# Patient Record
Sex: FEMALE | Race: WHITE | Employment: OTHER | URBAN - METROPOLITAN AREA
[De-identification: names, ages, dates, MRNs, and addresses within clinical notes are randomized per-mention and may not be internally consistent; named-entity substitution may affect disease eponyms.]

---

## 2017-12-17 PROBLEM — A41.9 SEPSIS (HCC): Status: ACTIVE | Noted: 2017-12-17

## 2018-01-29 ENCOUNTER — HOSPITAL ENCOUNTER (OUTPATIENT)
Dept: MAMMOGRAPHY | Age: 73
Discharge: HOME | End: 2018-01-29
Attending: FAMILY MEDICINE | Admitting: FAMILY MEDICINE

## 2018-01-29 DIAGNOSIS — Z12.31 ENCOUNTER FOR SCREENING MAMMOGRAM FOR BREAST CANCER: ICD-10-CM

## 2021-03-11 ENCOUNTER — HOSPITAL ENCOUNTER (EMERGENCY)
Age: 76
Discharge: HOME | End: 2021-03-11
Attending: EMERGENCY MEDICINE
Payer: MEDICARE

## 2021-03-11 ENCOUNTER — APPOINTMENT (OUTPATIENT)
Dept: CT IMAGING | Age: 76
End: 2021-03-11
Payer: MEDICARE

## 2021-03-11 ENCOUNTER — APPOINTMENT (OUTPATIENT)
Dept: GENERAL RADIOLOGY | Age: 76
End: 2021-03-11
Payer: MEDICARE

## 2021-03-11 VITALS
OXYGEN SATURATION: 97 % | TEMPERATURE: 99.1 F | SYSTOLIC BLOOD PRESSURE: 104 MMHG | RESPIRATION RATE: 20 BRPM | DIASTOLIC BLOOD PRESSURE: 62 MMHG | HEART RATE: 83 BPM | WEIGHT: 104 LBS | BODY MASS INDEX: 18.43 KG/M2 | HEIGHT: 63 IN

## 2021-03-11 DIAGNOSIS — G45.9 TIA (TRANSIENT ISCHEMIC ATTACK): ICD-10-CM

## 2021-03-11 DIAGNOSIS — R41.82 ALTERED MENTAL STATUS, UNSPECIFIED ALTERED MENTAL STATUS TYPE: ICD-10-CM

## 2021-03-11 DIAGNOSIS — Z53.29 LEFT AGAINST MEDICAL ADVICE: Primary | ICD-10-CM

## 2021-03-11 LAB
A/G RATIO: 1.6 (ref 1.1–2.2)
ALBUMIN SERPL-MCNC: 4.2 G/DL (ref 3.4–5)
ALP BLD-CCNC: 76 U/L (ref 40–129)
ALT SERPL-CCNC: 8 U/L (ref 10–40)
ANION GAP SERPL CALCULATED.3IONS-SCNC: 8 MMOL/L (ref 3–16)
AST SERPL-CCNC: 18 U/L (ref 15–37)
BASOPHILS ABSOLUTE: 0 K/UL (ref 0–0.2)
BASOPHILS RELATIVE PERCENT: 1 %
BILIRUB SERPL-MCNC: 0.5 MG/DL (ref 0–1)
BILIRUBIN URINE: NEGATIVE
BLOOD, URINE: ABNORMAL
BUN BLDV-MCNC: 14 MG/DL (ref 7–20)
CALCIUM SERPL-MCNC: 8.9 MG/DL (ref 8.3–10.6)
CHLORIDE BLD-SCNC: 102 MMOL/L (ref 99–110)
CLARITY: CLEAR
CO2: 24 MMOL/L (ref 21–32)
COLOR: YELLOW
CREAT SERPL-MCNC: 0.8 MG/DL (ref 0.6–1.2)
EOSINOPHILS ABSOLUTE: 0.1 K/UL (ref 0–0.6)
EOSINOPHILS RELATIVE PERCENT: 1.6 %
EPITHELIAL CELLS, UA: ABNORMAL /HPF (ref 0–5)
GFR AFRICAN AMERICAN: >60
GFR NON-AFRICAN AMERICAN: >60
GLOBULIN: 2.6 G/DL
GLUCOSE BLD-MCNC: 106 MG/DL (ref 70–99)
GLUCOSE URINE: NEGATIVE MG/DL
HCT VFR BLD CALC: 37.9 % (ref 36–48)
HEMOGLOBIN: 12.6 G/DL (ref 12–16)
INR BLD: 1.1 (ref 0.86–1.14)
KETONES, URINE: NEGATIVE MG/DL
LEUKOCYTE ESTERASE, URINE: NEGATIVE
LYMPHOCYTES ABSOLUTE: 0.8 K/UL (ref 1–5.1)
LYMPHOCYTES RELATIVE PERCENT: 16.8 %
MCH RBC QN AUTO: 29.7 PG (ref 26–34)
MCHC RBC AUTO-ENTMCNC: 33.2 G/DL (ref 31–36)
MCV RBC AUTO: 89.5 FL (ref 80–100)
MICROSCOPIC EXAMINATION: YES
MONOCYTES ABSOLUTE: 0.2 K/UL (ref 0–1.3)
MONOCYTES RELATIVE PERCENT: 4 %
NEUTROPHILS ABSOLUTE: 3.7 K/UL (ref 1.7–7.7)
NEUTROPHILS RELATIVE PERCENT: 76.6 %
NITRITE, URINE: NEGATIVE
PDW BLD-RTO: 13.6 % (ref 12.4–15.4)
PH UA: 7 (ref 5–8)
PLATELET # BLD: 208 K/UL (ref 135–450)
PMV BLD AUTO: 6.9 FL (ref 5–10.5)
POTASSIUM SERPL-SCNC: 4.2 MMOL/L (ref 3.5–5.1)
PROTEIN UA: NEGATIVE MG/DL
PROTHROMBIN TIME: 12.8 SEC (ref 10–13.2)
RBC # BLD: 4.23 M/UL (ref 4–5.2)
RBC UA: ABNORMAL /HPF (ref 0–4)
SARS-COV-2, NAAT: NOT DETECTED
SODIUM BLD-SCNC: 134 MMOL/L (ref 136–145)
SPECIFIC GRAVITY UA: 1.01 (ref 1–1.03)
TOTAL PROTEIN: 6.8 G/DL (ref 6.4–8.2)
TROPONIN: <0.01 NG/ML
URINE REFLEX TO CULTURE: ABNORMAL
URINE TYPE: ABNORMAL
UROBILINOGEN, URINE: 0.2 E.U./DL
WBC # BLD: 4.8 K/UL (ref 4–11)
WBC UA: ABNORMAL /HPF (ref 0–5)

## 2021-03-11 PROCEDURE — 2580000003 HC RX 258: Performed by: NURSE PRACTITIONER

## 2021-03-11 PROCEDURE — 87635 SARS-COV-2 COVID-19 AMP PRB: CPT

## 2021-03-11 PROCEDURE — 36415 COLL VENOUS BLD VENIPUNCTURE: CPT

## 2021-03-11 PROCEDURE — 70496 CT ANGIOGRAPHY HEAD: CPT

## 2021-03-11 PROCEDURE — 85610 PROTHROMBIN TIME: CPT

## 2021-03-11 PROCEDURE — 99285 EMERGENCY DEPT VISIT HI MDM: CPT

## 2021-03-11 PROCEDURE — 71045 X-RAY EXAM CHEST 1 VIEW: CPT

## 2021-03-11 PROCEDURE — 93005 ELECTROCARDIOGRAM TRACING: CPT | Performed by: EMERGENCY MEDICINE

## 2021-03-11 PROCEDURE — 6360000004 HC RX CONTRAST MEDICATION: Performed by: EMERGENCY MEDICINE

## 2021-03-11 PROCEDURE — 80053 COMPREHEN METABOLIC PANEL: CPT

## 2021-03-11 PROCEDURE — 84484 ASSAY OF TROPONIN QUANT: CPT

## 2021-03-11 PROCEDURE — 85025 COMPLETE CBC W/AUTO DIFF WBC: CPT

## 2021-03-11 PROCEDURE — 81001 URINALYSIS AUTO W/SCOPE: CPT

## 2021-03-11 PROCEDURE — 70450 CT HEAD/BRAIN W/O DYE: CPT

## 2021-03-11 PROCEDURE — 6370000000 HC RX 637 (ALT 250 FOR IP): Performed by: NURSE PRACTITIONER

## 2021-03-11 RX ORDER — ACETAMINOPHEN 325 MG/1
650 TABLET ORAL ONCE
Status: COMPLETED | OUTPATIENT
Start: 2021-03-11 | End: 2021-03-11

## 2021-03-11 RX ORDER — 0.9 % SODIUM CHLORIDE 0.9 %
1000 INTRAVENOUS SOLUTION INTRAVENOUS ONCE
Status: COMPLETED | OUTPATIENT
Start: 2021-03-11 | End: 2021-03-11

## 2021-03-11 RX ADMIN — SODIUM CHLORIDE 1000 ML: 9 INJECTION, SOLUTION INTRAVENOUS at 17:16

## 2021-03-11 RX ADMIN — IOPAMIDOL 75 ML: 755 INJECTION, SOLUTION INTRAVENOUS at 17:37

## 2021-03-11 RX ADMIN — ACETAMINOPHEN 650 MG: 325 TABLET ORAL at 17:16

## 2021-03-11 ASSESSMENT — PAIN SCALES - GENERAL
PAINLEVEL_OUTOF10: 4
PAINLEVEL_OUTOF10: 4

## 2021-03-11 NOTE — ED PROVIDER NOTES
[facility] Emergency Department    CHIEF COMPLAINT  Altered Mental Status (pt to ED via EMS from home with c/o AMS. pt is normally alert and oriented x 4. pt arrives alert and oriented to person place and time. pt reports she had her covid vaccine 2nd dose yesterday. family states pt did not drink any water today, only had a beer for lunch)      SHARED SERVICE VISIT  I have seen and evaluated this patient with my supervising physician, Dr. Emily Castellanos 80. HISTORY OF PRESENT ILLNESS  Junior Elmore is a 67 y.o. female who presents to the ED from home via EMS with reported AMS. The patient received her second vaccine dose on yesterday. She is awake, alert, and oriented to person time and place upon arrival in ED. Patient is generally weak but has a mild left-sided facial droop, possible slurring of her speech (patient has a Persian accent), has truncal instability, has left-sided tongue deviation and is leaning to her right on the stretcher. Spoke with patient's  who provides more history regarding why the patient was brought into the ED today and he states that the patient was last known [email or web address] hrs. today. They were both at home on separate levels at their home appointment called her on the telephone and asked 1500 hrs. she was responding to him in \"monosyllables\" which he attributed to her possibly being asleep. He continued working until 1600 hrs. and went to check on her and found her \"head slumped over to one side\" and she was minimally responsive. States he called his daughters who quickly arrived to their home and the EMS was called. No other complaints, modifying factors or associated symptoms. Nursing notes reviewed.    Past Medical History:   Diagnosis Date    Hyperlipidemia     Influenza A 12/13/2017    Kidney stones     Vision impairment     wears glasses     Past Surgical History:   Procedure Laterality Date    APPENDECTOMY      BREAST SURGERY      [facility] [place] [address]  left eye    11/2010    CYSTOSCOPY      EYE SURGERY      rt cataract    PITUITARY SURGERY      benign tumor     History reviewed. No pertinent family history. Social History     Socioeconomic History    Marital status:      Spouse name: Not on file    Number of children: Not on file    Years of education: Not on file    Highest education level: Not on file   Occupational History    Not on file   Social Needs    Financial resource strain: Not on file    Food insecurity     Worry: Not on file     Inability: Not on file    Transportation needs     Medical: Not on file     Non-medical: Not on file   Tobacco Use    Smoking status: Never Smoker    Smokeless tobacco: Never Used   Substance and Sexual Activity    Alcohol use: Yes     Comment: 2 drinks/day    Drug use: No    Sexual activity: Not on file   Lifestyle    Physical activity     Days per week: Not on file     Minutes per session: Not on file    Stress: Not on file   Relationships    Social connections     Talks on phone: Not on file     Gets together: Not on file     Attends Bahai service: Not on file     Active member of club or organization: Not on file     Attends meetings of clubs or organizations: Not on file     Relationship status: Not on file    Intimate partner violence     Fear of current or ex partner: Not on file     Emotionally abused: Not on file     Physically abused: Not on file     Forced sexual activity: Not on file   Other Topics Concern    Not on file   Social History Narrative    Not on file     No current facility-administered medications for this encounter. Current Outpatient Medications   Medication Sig Dispense Refill    guaiFENesin (MUCINEX) 600 MG extended release tablet Take 1 tablet by mouth 2 times daily 20 tablet 0    cycloSPORINE (RESTASIS) 0.05 % ophthalmic emulsion 1 drop.       Cholecalciferol (VITAMIN D3) 5000 units CAPS Take by mouth      ondansetron (ZOFRAN stable but hypertensive at 112/67 mmHg and with a temperature of 103. 1. A stroke/cardiac workup was initiated including COVID-19, urinalysis reflex to culture, microscopic urinalysis, pro time INR, CBC, CMP, troponin, EKG, CT head, CTA head and neck, and CXR. Interventions: Patient was placed on cardiac monitoring and after NIH stroke scale performed and patient score = 2 she was taken immediately for stroke imaging including CT head without and CTA head and neck with contrast.  Patient score of 2 was due to facial droop and slurred speech. Spoke with Dr. Patti Peralta from the stroke team and gave patient's demographic, HPI, medical history, and last known well. Confirmed that the patient is not a TPA candidate due to last known well of 1300 hrs.         Labs Ordered:  I have reviewed and interpreted all of the currently available lab results from this visit:  Results for orders placed or performed during the hospital encounter of 03/11/21   COVID-19, Rapid    Specimen: Nasopharyngeal Swab   Result Value Ref Range    SARS-CoV-2, NAAT Not Detected Not Detected   CBC auto differential   Result Value Ref Range    WBC 4.8 4.0 - 11.0 K/uL    RBC 4.23 4.00 - 5.20 M/uL    Hemoglobin 12.6 12.0 - 16.0 g/dL    Hematocrit 37.9 36.0 - 48.0 %    MCV 89.5 80.0 - 100.0 fL    MCH 29.7 26.0 - 34.0 pg    MCHC 33.2 31.0 - 36.0 g/dL    RDW 13.6 12.4 - 15.4 %    Platelets 640 134 - 579 K/uL    MPV 6.9 5.0 - 10.5 fL    Neutrophils % 76.6 %    Lymphocytes % 16.8 %    Monocytes % 4.0 %    Eosinophils % 1.6 %    Basophils % 1.0 %    Neutrophils Absolute 3.7 1.7 - 7.7 K/uL    Lymphocytes Absolute 0.8 (L) 1.0 - 5.1 K/uL    Monocytes Absolute 0.2 0.0 - 1.3 K/uL    Eosinophils Absolute 0.1 0.0 - 0.6 K/uL    Basophils Absolute 0.0 0.0 - 0.2 K/uL   Comprehensive metabolic panel   Result Value Ref Range    Sodium 134 (L) 136 - 145 mmol/L    Potassium 4.2 3.5 - 5.1 mmol/L    Chloride 102 99 - 110 mmol/L    CO2 24 21 - 32 mmol/L    Anion Gap 8 3 - 16    Glucose 106 (H) 70 - 99 mg/dL    BUN 14 7 - 20 mg/dL    CREATININE 0.8 0.6 - 1.2 mg/dL    GFR Non-African American >60 >60    GFR African American >60 >60    Calcium 8.9 8.3 - 10.6 mg/dL    Total Protein 6.8 6.4 - 8.2 g/dL    Albumin 4.2 3.4 - 5.0 g/dL    Albumin/Globulin Ratio 1.6 1.1 - 2.2    Total Bilirubin 0.5 0.0 - 1.0 mg/dL    Alkaline Phosphatase 76 40 - 129 U/L    ALT 8 (L) 10 - 40 U/L    AST 18 15 - 37 U/L    Globulin 2.6 g/dL   Urinalysis Reflex to Culture    Specimen: Urine, clean catch   Result Value Ref Range    Color, UA Yellow Straw/Yellow    Clarity, UA Clear Clear    Glucose, Ur Negative Negative mg/dL    Bilirubin Urine Negative Negative    Ketones, Urine Negative Negative mg/dL    Specific Gravity, UA 1.015 1.005 - 1.030    Blood, Urine MODERATE (A) Negative    pH, UA 7.0 5.0 - 8.0    Protein, UA Negative Negative mg/dL    Urobilinogen, Urine 0.2 <2.0 E.U./dL    Nitrite, Urine Negative Negative    Leukocyte Esterase, Urine Negative Negative    Microscopic Examination YES     Urine Type NotGiven     Urine Reflex to Culture Not Indicated    Troponin   Result Value Ref Range    Troponin <0.01 <0.01 ng/mL   Protime-INR   Result Value Ref Range    Protime 12.8 10.0 - 13.2 sec    INR 1.10 0.86 - 1.14   Microscopic Urinalysis   Result Value Ref Range    WBC, UA None seen 0 - 5 /HPF    RBC, UA 5-10 (A) 0 - 4 /HPF    Epithelial Cells, UA 0-1 0 - 5 /HPF   EKG 12 Lead   Result Value Ref Range    Ventricular Rate 89 BPM    Atrial Rate 89 BPM    P-R Interval 142 ms    QRS Duration 68 ms    Q-T Interval 368 ms    QTc Calculation (Bazett) 447 ms    P Axis 36 degrees    R Axis 30 degrees    T Axis 45 degrees    Diagnosis       Normal sinus rhythmNormal ECGConfirmed by Maggie Pierson MD, Jodi Fox ([id number]) on 3/12/2021 11:36:13 AM           Imaging ordered:  Ct Head Wo Contrast    Result Date: 3/11/2021  No acute intracranial abnormality. Findings were discussed with Ally Shepard at 5:57 pm on 3/11/2021. Xr Chest Portable    Result Date: 3/11/2021  No acute process. Cta Head Neck W Contrast    Result Date: 3/11/2021  No acute arterial abnormality or hemodynamically significant arterial stenosis in the head or neck. Reevaluation:  Patient resting comfortably with eyes open and with stable vital signs. She is now afebrile and looks much better. Patient states that she has baseline facial droop, speech is not slurred, has improved with bilateral upper and lower extremity strength, and is overall more well appearing. Discussed admission with the patient who states she wants to go home and refuses admission. States that this has happened in the past when she was febrile and is not concerned that this is a TIA or other neurological problem. Patient states that she will sign out AMA. A discussion was had with Mrs. Bonds regarding AMA,  altered mental status, TIA, intention to admit and refused/declined admission. Risk management discussed and shared decision making had with patient and/or surrogate. All questions were answered. Patient will follow up with her PCP for further evaluation/treatment. All questions answered. Patient will return to ED for new/worsening symptoms. Patient is agreeable with this plan. Patient was not sent home with prescriptions. CRITICAL CARE TIME  0 Minutes of critical care time spent not including separately billable procedures. MDM  Patient presents to the emergency department with altered mental status/TIA. Alternate diagnoses are less likely based on history and physical. Considered stroke, intracranial bleed, trauma, infection/sepsis, medication side effect, intoxication/withdrawal, metabolic/electrolyte abnormalities, among others but less likely based on history and physical.    Patient had her second Covid vaccination on yesterday and experienced AMS and TIA today. Imaging via CT head and CTA head and neck with IV contrast rule out a stroke. However, the recommendation was for admission for further neurological evaluation but patient declines admission as she feels that this is a febrile reaction to a temperature of 103.1 as if she has had the same sort of reaction in the past 2 fevers. Discussed possible adverse events associated with the decision to sign out AMA up to and including disability and/or death. Patient states that she is willing to accept the consequences of the decision to sign out AMA. My attending physician nor I feel that the patient is safe or appropriate for discharged home but she refuses admission and will sign out AMA. She is of appropriate mentation, awake, alert, and oriented to make her own decisions and is therefore allowed to sign out AMA. She is instructed to follow-up with her PCP in the next 1-2 days to schedule follow-up appointment for reevaluation and will return to the emergency department for new or worsening symptoms including, but not limited to, developing unilateral extremity weakness, inability to ambulate, confusion, slurred speech, facial droop, or other concerns. Patient verbalized understanding and states she will follow up with her PCP. Clinical Impression:  Left AGAINST MEDICAL ADVICE  Altered mental status-resolved  Transient ischemic attack      DISPOSITION  (AMA) AGAINST MEDICAL ADVICE      This chart was created using Dragon dictation. Every effort was made by myself to ensure accuracy, however due to limitations of this technology errors may be present.       MICHELLE Rodgers - CNP  03/13/21 2093

## 2021-03-11 NOTE — ED NOTES
Pt arrival 1658  ED Carlos RODRIGUEZ assess & ordered  stroke team [phone]  CT notified of ED CODE STROKE 1722  PAGED \"ED CODE STROKE\" overhead [phone]  Lab notified of ED CODE STROKE 1723   stroke Dr. Jocy Henderson @ [id number] Sophia Maya RN  03/11/21 5130

## 2021-03-11 NOTE — ED NOTES
Bed: 04  Expected date:   Expected time:   Means of arrival:   Comments:  [address], [facility]  03/11/21 7988

## 2021-03-12 LAB
EKG ATRIAL RATE: 89 BPM
EKG DIAGNOSIS: NORMAL
EKG P AXIS: 36 DEGREES
EKG P-R INTERVAL: 142 MS
EKG Q-T INTERVAL: 368 MS
EKG QRS DURATION: 68 MS
EKG QTC CALCULATION (BAZETT): 447 MS
EKG R AXIS: 30 DEGREES
EKG T AXIS: 45 DEGREES
EKG VENTRICULAR RATE: 89 BPM

## 2021-03-12 PROCEDURE — 93010 ELECTROCARDIOGRAM REPORT: CPT | Performed by: INTERNAL MEDICINE

## 2022-05-06 ENCOUNTER — APPOINTMENT (OUTPATIENT)
Dept: GENERAL RADIOLOGY | Age: 77
DRG: 536 | End: 2022-05-06
Payer: MEDICARE

## 2022-05-06 ENCOUNTER — APPOINTMENT (OUTPATIENT)
Dept: CT IMAGING | Age: 77
DRG: 536 | End: 2022-05-06
Payer: MEDICARE

## 2022-05-06 ENCOUNTER — HOSPITAL ENCOUNTER (INPATIENT)
Age: 77
LOS: 4 days | Discharge: NURSING, REHAB OR LONG-TERM CARE | DRG: 536 | End: 2022-05-10
Attending: EMERGENCY MEDICINE | Admitting: INTERNAL MEDICINE
Payer: MEDICARE

## 2022-05-06 DIAGNOSIS — S32.810A MULTIPLE CLOSED FRACTURES OF PELVIS WITH STABLE DISRUPTION OF PELVIC RING, INITIAL ENCOUNTER (HCC): Primary | ICD-10-CM

## 2022-05-06 PROBLEM — S32.592A PUBIC RAMUS FRACTURE, LEFT, CLOSED, INITIAL ENCOUNTER (HCC): Status: ACTIVE | Noted: 2022-05-06

## 2022-05-06 LAB
A/G RATIO: 1.2 (ref 1.1–2.2)
ALBUMIN SERPL-MCNC: 3.9 G/DL (ref 3.4–5)
ALP BLD-CCNC: 78 U/L (ref 40–129)
ALT SERPL-CCNC: 25 U/L (ref 10–40)
ANION GAP SERPL CALCULATED.3IONS-SCNC: 13 MMOL/L (ref 3–16)
AST SERPL-CCNC: 43 U/L (ref 15–37)
BASOPHILS ABSOLUTE: 0 K/UL (ref 0–0.2)
BASOPHILS RELATIVE PERCENT: 0.8 %
BILIRUB SERPL-MCNC: 0.9 MG/DL (ref 0–1)
BILIRUBIN URINE: NEGATIVE
BLOOD, URINE: ABNORMAL
BUN BLDV-MCNC: 10 MG/DL (ref 7–20)
CALCIUM SERPL-MCNC: 9.8 MG/DL (ref 8.3–10.6)
CHLORIDE BLD-SCNC: 95 MMOL/L (ref 99–110)
CLARITY: CLEAR
CO2: 22 MMOL/L (ref 21–32)
COLOR: ABNORMAL
CREAT SERPL-MCNC: 0.6 MG/DL (ref 0.6–1.2)
EOSINOPHILS ABSOLUTE: 0.2 K/UL (ref 0–0.6)
EOSINOPHILS RELATIVE PERCENT: 3.9 %
GFR AFRICAN AMERICAN: >60
GFR NON-AFRICAN AMERICAN: >60
GLUCOSE BLD-MCNC: 110 MG/DL (ref 70–99)
GLUCOSE URINE: NEGATIVE MG/DL
HCT VFR BLD CALC: 39.7 % (ref 36–48)
HEMOGLOBIN: 13 G/DL (ref 12–16)
KETONES, URINE: 15 MG/DL
LEUKOCYTE ESTERASE, URINE: NEGATIVE
LYMPHOCYTES ABSOLUTE: 0.8 K/UL (ref 1–5.1)
LYMPHOCYTES RELATIVE PERCENT: 17 %
MCH RBC QN AUTO: 29.2 PG (ref 26–34)
MCHC RBC AUTO-ENTMCNC: 32.6 G/DL (ref 31–36)
MCV RBC AUTO: 89.5 FL (ref 80–100)
MICROSCOPIC EXAMINATION: YES
MONOCYTES ABSOLUTE: 0.1 K/UL (ref 0–1.3)
MONOCYTES RELATIVE PERCENT: 2.2 %
NEUTROPHILS ABSOLUTE: 3.7 K/UL (ref 1.7–7.7)
NEUTROPHILS RELATIVE PERCENT: 76.1 %
NITRITE, URINE: NEGATIVE
PDW BLD-RTO: 14.5 % (ref 12.4–15.4)
PH UA: 7.5 (ref 5–8)
PLATELET # BLD: 171 K/UL (ref 135–450)
PMV BLD AUTO: 7 FL (ref 5–10.5)
POTASSIUM REFLEX MAGNESIUM: 4.1 MMOL/L (ref 3.5–5.1)
PROTEIN UA: NEGATIVE MG/DL
RBC # BLD: 4.44 M/UL (ref 4–5.2)
RBC UA: ABNORMAL /HPF (ref 0–4)
SODIUM BLD-SCNC: 130 MMOL/L (ref 136–145)
SPECIFIC GRAVITY UA: 1.01 (ref 1–1.03)
TOTAL CK: 404 U/L (ref 26–192)
TOTAL PROTEIN: 7.2 G/DL (ref 6.4–8.2)
TROPONIN: <0.01 NG/ML
URINE REFLEX TO CULTURE: ABNORMAL
URINE TYPE: ABNORMAL
UROBILINOGEN, URINE: 1 E.U./DL
WBC # BLD: 4.9 K/UL (ref 4–11)
WBC UA: ABNORMAL /HPF (ref 0–5)

## 2022-05-06 PROCEDURE — 81001 URINALYSIS AUTO W/SCOPE: CPT

## 2022-05-06 PROCEDURE — 99285 EMERGENCY DEPT VISIT HI MDM: CPT

## 2022-05-06 PROCEDURE — G0378 HOSPITAL OBSERVATION PER HR: HCPCS

## 2022-05-06 PROCEDURE — 80053 COMPREHEN METABOLIC PANEL: CPT

## 2022-05-06 PROCEDURE — 82550 ASSAY OF CK (CPK): CPT

## 2022-05-06 PROCEDURE — 83935 ASSAY OF URINE OSMOLALITY: CPT

## 2022-05-06 PROCEDURE — 73700 CT LOWER EXTREMITY W/O DYE: CPT

## 2022-05-06 PROCEDURE — 96374 THER/PROPH/DIAG INJ IV PUSH: CPT

## 2022-05-06 PROCEDURE — 96361 HYDRATE IV INFUSION ADD-ON: CPT

## 2022-05-06 PROCEDURE — 84300 ASSAY OF URINE SODIUM: CPT

## 2022-05-06 PROCEDURE — 73502 X-RAY EXAM HIP UNI 2-3 VIEWS: CPT

## 2022-05-06 PROCEDURE — 6360000002 HC RX W HCPCS: Performed by: EMERGENCY MEDICINE

## 2022-05-06 PROCEDURE — 85025 COMPLETE CBC W/AUTO DIFF WBC: CPT

## 2022-05-06 PROCEDURE — 93005 ELECTROCARDIOGRAM TRACING: CPT | Performed by: EMERGENCY MEDICINE

## 2022-05-06 PROCEDURE — 84484 ASSAY OF TROPONIN QUANT: CPT

## 2022-05-06 PROCEDURE — 2580000003 HC RX 258: Performed by: EMERGENCY MEDICINE

## 2022-05-06 PROCEDURE — 71045 X-RAY EXAM CHEST 1 VIEW: CPT

## 2022-05-06 PROCEDURE — 70450 CT HEAD/BRAIN W/O DYE: CPT

## 2022-05-06 PROCEDURE — 72125 CT NECK SPINE W/O DYE: CPT

## 2022-05-06 PROCEDURE — 1200000000 HC SEMI PRIVATE

## 2022-05-06 RX ORDER — ACETAMINOPHEN 325 MG/1
650 TABLET ORAL EVERY 6 HOURS PRN
Status: DISCONTINUED | OUTPATIENT
Start: 2022-05-06 | End: 2022-05-10 | Stop reason: HOSPADM

## 2022-05-06 RX ORDER — OXYCODONE HYDROCHLORIDE 5 MG/1
5 TABLET ORAL EVERY 4 HOURS PRN
Status: DISCONTINUED | OUTPATIENT
Start: 2022-05-06 | End: 2022-05-10 | Stop reason: HOSPADM

## 2022-05-06 RX ORDER — FENTANYL CITRATE 50 UG/ML
50 INJECTION, SOLUTION INTRAMUSCULAR; INTRAVENOUS ONCE
Status: COMPLETED | OUTPATIENT
Start: 2022-05-06 | End: 2022-05-06

## 2022-05-06 RX ORDER — SODIUM CHLORIDE 0.9 % (FLUSH) 0.9 %
10 SYRINGE (ML) INJECTION PRN
Status: DISCONTINUED | OUTPATIENT
Start: 2022-05-06 | End: 2022-05-10 | Stop reason: HOSPADM

## 2022-05-06 RX ORDER — POTASSIUM CHLORIDE 7.45 MG/ML
10 INJECTION INTRAVENOUS PRN
Status: DISCONTINUED | OUTPATIENT
Start: 2022-05-06 | End: 2022-05-10 | Stop reason: HOSPADM

## 2022-05-06 RX ORDER — SODIUM CHLORIDE 9 MG/ML
INJECTION, SOLUTION INTRAVENOUS PRN
Status: DISCONTINUED | OUTPATIENT
Start: 2022-05-06 | End: 2022-05-10 | Stop reason: HOSPADM

## 2022-05-06 RX ORDER — OXYCODONE HYDROCHLORIDE 5 MG/1
10 TABLET ORAL EVERY 4 HOURS PRN
Status: DISCONTINUED | OUTPATIENT
Start: 2022-05-06 | End: 2022-05-10 | Stop reason: HOSPADM

## 2022-05-06 RX ORDER — ONDANSETRON 2 MG/ML
4 INJECTION INTRAMUSCULAR; INTRAVENOUS ONCE
Status: COMPLETED | OUTPATIENT
Start: 2022-05-06 | End: 2022-05-06

## 2022-05-06 RX ORDER — ACETAMINOPHEN 650 MG/1
650 SUPPOSITORY RECTAL EVERY 6 HOURS PRN
Status: DISCONTINUED | OUTPATIENT
Start: 2022-05-06 | End: 2022-05-10 | Stop reason: HOSPADM

## 2022-05-06 RX ORDER — UBIDECARENONE 75 MG
50 CAPSULE ORAL DAILY
COMMUNITY

## 2022-05-06 RX ORDER — SODIUM CHLORIDE 0.9 % (FLUSH) 0.9 %
10 SYRINGE (ML) INJECTION EVERY 12 HOURS SCHEDULED
Status: DISCONTINUED | OUTPATIENT
Start: 2022-05-06 | End: 2022-05-10 | Stop reason: HOSPADM

## 2022-05-06 RX ORDER — PROMETHAZINE HYDROCHLORIDE 25 MG/1
12.5 TABLET ORAL EVERY 6 HOURS PRN
Status: DISCONTINUED | OUTPATIENT
Start: 2022-05-06 | End: 2022-05-10 | Stop reason: HOSPADM

## 2022-05-06 RX ORDER — ENOXAPARIN SODIUM 100 MG/ML
40 INJECTION SUBCUTANEOUS DAILY
Status: DISCONTINUED | OUTPATIENT
Start: 2022-05-07 | End: 2022-05-07

## 2022-05-06 RX ORDER — ONDANSETRON 2 MG/ML
4 INJECTION INTRAMUSCULAR; INTRAVENOUS EVERY 6 HOURS PRN
Status: DISCONTINUED | OUTPATIENT
Start: 2022-05-06 | End: 2022-05-10 | Stop reason: HOSPADM

## 2022-05-06 RX ORDER — SODIUM CHLORIDE 9 MG/ML
1000 INJECTION, SOLUTION INTRAVENOUS CONTINUOUS
Status: DISCONTINUED | OUTPATIENT
Start: 2022-05-06 | End: 2022-05-07

## 2022-05-06 RX ORDER — MAGNESIUM SULFATE IN WATER 40 MG/ML
2000 INJECTION, SOLUTION INTRAVENOUS PRN
Status: DISCONTINUED | OUTPATIENT
Start: 2022-05-06 | End: 2022-05-10 | Stop reason: HOSPADM

## 2022-05-06 RX ADMIN — SODIUM CHLORIDE 1000 ML: 9 INJECTION, SOLUTION INTRAVENOUS at 18:27

## 2022-05-06 RX ADMIN — ONDANSETRON 4 MG: 2 INJECTION INTRAMUSCULAR; INTRAVENOUS at 18:27

## 2022-05-06 RX ADMIN — FENTANYL CITRATE 50 MCG: 50 INJECTION, SOLUTION INTRAMUSCULAR; INTRAVENOUS at 18:27

## 2022-05-06 ASSESSMENT — PAIN DESCRIPTION - DESCRIPTORS: DESCRIPTORS: ACHING

## 2022-05-06 ASSESSMENT — PAIN SCALES - GENERAL
PAINLEVEL_OUTOF10: 0
PAINLEVEL_OUTOF10: 2
PAINLEVEL_OUTOF10: 5

## 2022-05-06 ASSESSMENT — PAIN DESCRIPTION - LOCATION
LOCATION: HIP
LOCATION: HIP

## 2022-05-06 ASSESSMENT — PAIN DESCRIPTION - ORIENTATION
ORIENTATION: LEFT
ORIENTATION: LEFT

## 2022-05-06 ASSESSMENT — PAIN - FUNCTIONAL ASSESSMENT: PAIN_FUNCTIONAL_ASSESSMENT: 0-10

## 2022-05-07 ENCOUNTER — APPOINTMENT (OUTPATIENT)
Dept: CT IMAGING | Age: 77
DRG: 536 | End: 2022-05-07
Payer: MEDICARE

## 2022-05-07 LAB
A/G RATIO: 1.3 (ref 1.1–2.2)
ALBUMIN SERPL-MCNC: 4 G/DL (ref 3.4–5)
ALP BLD-CCNC: 82 U/L (ref 40–129)
ALT SERPL-CCNC: 22 U/L (ref 10–40)
ANION GAP SERPL CALCULATED.3IONS-SCNC: 14 MMOL/L (ref 3–16)
AST SERPL-CCNC: 30 U/L (ref 15–37)
BASOPHILS ABSOLUTE: 0 K/UL (ref 0–0.2)
BASOPHILS RELATIVE PERCENT: 0.8 %
BILIRUB SERPL-MCNC: 0.9 MG/DL (ref 0–1)
BUN BLDV-MCNC: 6 MG/DL (ref 7–20)
CALCIUM SERPL-MCNC: 8.7 MG/DL (ref 8.3–10.6)
CHLORIDE BLD-SCNC: 98 MMOL/L (ref 99–110)
CO2: 23 MMOL/L (ref 21–32)
CREAT SERPL-MCNC: 0.6 MG/DL (ref 0.6–1.2)
EKG ATRIAL RATE: 103 BPM
EKG DIAGNOSIS: NORMAL
EKG P AXIS: 46 DEGREES
EKG P-R INTERVAL: 194 MS
EKG Q-T INTERVAL: 304 MS
EKG QRS DURATION: 70 MS
EKG QTC CALCULATION (BAZETT): 398 MS
EKG R AXIS: 30 DEGREES
EKG T AXIS: -31 DEGREES
EKG VENTRICULAR RATE: 103 BPM
EOSINOPHILS ABSOLUTE: 0.2 K/UL (ref 0–0.6)
EOSINOPHILS RELATIVE PERCENT: 4.1 %
GFR AFRICAN AMERICAN: >60
GFR NON-AFRICAN AMERICAN: >60
GLUCOSE BLD-MCNC: 112 MG/DL (ref 70–99)
HCT VFR BLD CALC: 37.2 % (ref 36–48)
HEMOGLOBIN: 12.6 G/DL (ref 12–16)
LYMPHOCYTES ABSOLUTE: 0.7 K/UL (ref 1–5.1)
LYMPHOCYTES RELATIVE PERCENT: 14.8 %
MAGNESIUM: 2 MG/DL (ref 1.8–2.4)
MCH RBC QN AUTO: 29.7 PG (ref 26–34)
MCHC RBC AUTO-ENTMCNC: 33.9 G/DL (ref 31–36)
MCV RBC AUTO: 87.5 FL (ref 80–100)
MONOCYTES ABSOLUTE: 0.1 K/UL (ref 0–1.3)
MONOCYTES RELATIVE PERCENT: 2.5 %
NEUTROPHILS ABSOLUTE: 3.7 K/UL (ref 1.7–7.7)
NEUTROPHILS RELATIVE PERCENT: 77.8 %
OSMOLALITY URINE: 675 MOSM/KG (ref 390–1070)
OSMOLALITY: 280 MOSM/KG (ref 280–301)
PDW BLD-RTO: 14.2 % (ref 12.4–15.4)
PLATELET # BLD: 168 K/UL (ref 135–450)
PMV BLD AUTO: 7.3 FL (ref 5–10.5)
POTASSIUM REFLEX MAGNESIUM: 4.1 MMOL/L (ref 3.5–5.1)
PRO-BNP: 630 PG/ML (ref 0–449)
RBC # BLD: 4.25 M/UL (ref 4–5.2)
SODIUM BLD-SCNC: 135 MMOL/L (ref 136–145)
SODIUM URINE: 155 MMOL/L
TOTAL PROTEIN: 7.1 G/DL (ref 6.4–8.2)
TROPONIN: <0.01 NG/ML
VITAMIN D 25-HYDROXY: 37.8 NG/ML
WBC # BLD: 4.8 K/UL (ref 4–11)

## 2022-05-07 PROCEDURE — 84484 ASSAY OF TROPONIN QUANT: CPT

## 2022-05-07 PROCEDURE — 99222 1ST HOSP IP/OBS MODERATE 55: CPT | Performed by: ORTHOPAEDIC SURGERY

## 2022-05-07 PROCEDURE — 85025 COMPLETE CBC W/AUTO DIFF WBC: CPT

## 2022-05-07 PROCEDURE — 2700000000 HC OXYGEN THERAPY PER DAY

## 2022-05-07 PROCEDURE — 94761 N-INVAS EAR/PLS OXIMETRY MLT: CPT

## 2022-05-07 PROCEDURE — 36415 COLL VENOUS BLD VENIPUNCTURE: CPT

## 2022-05-07 PROCEDURE — 6370000000 HC RX 637 (ALT 250 FOR IP): Performed by: INTERNAL MEDICINE

## 2022-05-07 PROCEDURE — 83735 ASSAY OF MAGNESIUM: CPT

## 2022-05-07 PROCEDURE — 83880 ASSAY OF NATRIURETIC PEPTIDE: CPT

## 2022-05-07 PROCEDURE — 1200000000 HC SEMI PRIVATE

## 2022-05-07 PROCEDURE — 83930 ASSAY OF BLOOD OSMOLALITY: CPT

## 2022-05-07 PROCEDURE — G0378 HOSPITAL OBSERVATION PER HR: HCPCS

## 2022-05-07 PROCEDURE — 6360000002 HC RX W HCPCS: Performed by: INTERNAL MEDICINE

## 2022-05-07 PROCEDURE — 80053 COMPREHEN METABOLIC PANEL: CPT

## 2022-05-07 PROCEDURE — 71250 CT THORAX DX C-: CPT

## 2022-05-07 PROCEDURE — 93010 ELECTROCARDIOGRAM REPORT: CPT | Performed by: INTERNAL MEDICINE

## 2022-05-07 PROCEDURE — 82306 VITAMIN D 25 HYDROXY: CPT

## 2022-05-07 PROCEDURE — 2580000003 HC RX 258: Performed by: INTERNAL MEDICINE

## 2022-05-07 PROCEDURE — 97530 THERAPEUTIC ACTIVITIES: CPT

## 2022-05-07 PROCEDURE — 2580000003 HC RX 258: Performed by: EMERGENCY MEDICINE

## 2022-05-07 PROCEDURE — 97166 OT EVAL MOD COMPLEX 45 MIN: CPT

## 2022-05-07 PROCEDURE — 97162 PT EVAL MOD COMPLEX 30 MIN: CPT

## 2022-05-07 RX ORDER — ENOXAPARIN SODIUM 100 MG/ML
30 INJECTION SUBCUTANEOUS DAILY
Status: DISCONTINUED | OUTPATIENT
Start: 2022-05-07 | End: 2022-05-10 | Stop reason: HOSPADM

## 2022-05-07 RX ORDER — POTASSIUM CHLORIDE 20 MEQ/1
40 TABLET, EXTENDED RELEASE ORAL ONCE
Status: DISCONTINUED | OUTPATIENT
Start: 2022-05-07 | End: 2022-05-10 | Stop reason: HOSPADM

## 2022-05-07 RX ORDER — ENOXAPARIN SODIUM 100 MG/ML
40 INJECTION SUBCUTANEOUS DAILY
Status: DISCONTINUED | OUTPATIENT
Start: 2022-05-07 | End: 2022-05-07 | Stop reason: DRUGHIGH

## 2022-05-07 RX ADMIN — OXYCODONE 5 MG: 5 TABLET ORAL at 10:26

## 2022-05-07 RX ADMIN — SODIUM CHLORIDE 1000 ML: 9 INJECTION, SOLUTION INTRAVENOUS at 04:08

## 2022-05-07 RX ADMIN — OXYCODONE 10 MG: 5 TABLET ORAL at 16:05

## 2022-05-07 RX ADMIN — ENOXAPARIN SODIUM 30 MG: 100 INJECTION SUBCUTANEOUS at 16:05

## 2022-05-07 RX ADMIN — SODIUM CHLORIDE, PRESERVATIVE FREE 10 ML: 5 INJECTION INTRAVENOUS at 10:26

## 2022-05-07 ASSESSMENT — PAIN - FUNCTIONAL ASSESSMENT
PAIN_FUNCTIONAL_ASSESSMENT: PREVENTS OR INTERFERES WITH MANY ACTIVE NOT PASSIVE ACTIVITIES
PAIN_FUNCTIONAL_ASSESSMENT: PREVENTS OR INTERFERES SOME ACTIVE ACTIVITIES AND ADLS

## 2022-05-07 ASSESSMENT — PAIN DESCRIPTION - LOCATION: LOCATION: PELVIS;HIP

## 2022-05-07 ASSESSMENT — PAIN SCALES - GENERAL
PAINLEVEL_OUTOF10: 3
PAINLEVEL_OUTOF10: 10
PAINLEVEL_OUTOF10: 10

## 2022-05-07 ASSESSMENT — PAIN DESCRIPTION - ORIENTATION: ORIENTATION: LEFT

## 2022-05-07 NOTE — CONSULTS
Date of Admission: 5/6/2022     Date of Service: Pt seen/examined on 5/7/2022     Chief complaint: left hip/pelvis pain     History Of Present Illness:       67 y.o. female who presented to [facility]  ED after a fall.     Patient reports she was walking outside her house and then turned and fell landing on her left side. Patient reported that after the fall she was not able to ambulate due to pain. She reports no antecedent pain but has had multiple recent falls. The patient lives with her .        Past Medical History:       Past Medical History             Diagnosis Date    Hyperlipidemia      Influenza A 12/13/2017    Kidney stones      Vision impairment       wears glasses            Past Surgical History:       Past Surgical History[]Expand by Default             Procedure Laterality Date    APPENDECTOMY        BREAST SURGERY         mass 1970    CATARACT REMOVAL   left eye    11/2010    CYSTOSCOPY        EYE SURGERY         rt cataract    PITUITARY SURGERY         benign tumor            Medications Prior to Admission:      Home Medications[]Expand by Default           Prior to Admission medications    Medication Sig Start Date End Date Taking? Authorizing Provider   vitamin B-12 (CYANOCOBALAMIN) 100 MCG tablet Take 50 mcg by mouth daily     Yes Historical Provider, MD   cycloSPORINE (RESTASIS) 0.05 % ophthalmic emulsion 1 drop.       Historical Provider, MD   Cholecalciferol (VITAMIN D3) 5000 units CAPS Take by mouth       Historical Provider, MD            Allergies:  Patient has no known allergies.     Social History:           TOBACCO:   reports that she has never smoked.  She has never used smokeless tobacco.    Family History:       Reviewed in detail, and noncontributory        REVIEW OF SYSTEMS: otherwise negative        PHYSICAL EXAM PERFORMED:     /80   Pulse 100   Temp 97.5 °F (36.4 °C) (Oral)   Resp 17   LMP  (LMP Unknown)   SpO2 94%      General appearance: no distress, appears her stated age, alert and oriented  MSK: Bilateral lower extremities are without abnormal appearance or deformity. She is able to dorsiflex and plantarflex both feet at the ankles as well as flex and extend her toes without weakness. Light touch sensation is intact bilaterally in all nerve distributions. Her feet are warm and well perfused. Labs:          Recent Labs     05/06/22  1800   WBC 4.9   HGB 13.0   HCT 39.7             Recent Labs     05/06/22  1800   *   K 4.1   CL 95*   CO2 22   BUN 10   CREATININE 0.6   CALCIUM 9.8           Radiology:      I have reviewed her pelvis and hip x-rays as well as her hip CT scan.          CT HIP LEFT WO CONTRAST   Final Result   1. Acute fracture of the left sacral ala. 2. Acute and mildly displaced fracture of the left superior pubic ramus   extending into the pubic symphysis. 3. Acute and mildly displaced fracture of the left inferior pubic ramus. 4. Osteopenia.            ASSESSMENT AND PLAN:    66 y/o woman with a stable LCI pelvis fracture. She is hemodynamically stable and neurovascularly intact. No surgical intervention is indicated. Recommendations:  -Weight bearing as tolerated using a walker as needed. PT/OT  -Osteopenia/osteoporosis evaluation and treatment  -DVT prophylaxis  -Pain control    Please call with questions.

## 2022-05-07 NOTE — ED NOTES
Report called to Emerson Hickey RN, C5. Pt transported to room 526 with tele box placed.       Gregg Mitchell RN  05/06/22 6698

## 2022-05-07 NOTE — CONSULTS
Comprehensive Nutrition Assessment    Type and Reason for Visit:  Initial,Consult    Nutrition Recommendations/Plan:   1. Advance diet as medically feasible to general for increased menu options   2. Monitor nutrition adequacy, pertinent labs, bowel habits, wt changes, and clinical progress     Malnutrition Assessment:  Malnutrition Status: At risk for malnutrition (Comment) (pt appears frail, ?malnutrition vs. pt always has been small?) (05/07/22 1236)    Context:  Acute Illness       Nutrition Assessment:    Consulted for unintentional weight loss. Pt nutritionally compromised AEB decreased appetite. Pt currently NPO, asking for food. Pt states eats 3 meals daily at home, but small portions. During visit pt reports lactose intolerance, RD added to allergy list. No weight hx. Pt reports UBW of 104 lb, but at one time she was 130 lb. ALEX when weight loss occured. Declined lactose free ONS. Will continue to monitor. Nutrition Related Findings:    Appears frail, ?malnutrition or always been small. Na 135. Wound Type: None       Current Nutrition Intake & Therapies:    Average Meal Intake: NPO  Average Supplements Intake: NPO  ADULT DIET; Regular    Anthropometric Measures:  Height: 5' 3\" (160 cm)  Ideal Body Weight (IBW): 115 lbs (52 kg)       Current Body Weight: 102 lb (46.3 kg), 88.7 % IBW.  Weight Source: Bed Scale  Current BMI (kg/m2): 18.1        Weight Adjustment For: No Adjustment                 BMI Categories: Underweight (BMI less than 18.5)    Estimated Daily Nutrient Needs:  Energy Requirements Based On: Kcal/kg (25-30)  Weight Used for Energy Requirements: Ideal  Energy (kcal/day): 9905-6952 kcal  Weight Used for Protein Requirements: Ideal (1.0-1.2 g/kg)  Protein (g/day): 53-64 g  Method Used for Fluid Requirements: 1 ml/kcal  Fluid (ml/day): 1608-9691 mL    Nutrition Diagnosis:   · Inadequate oral intake related to inadequate protein-energy intake as evidenced by NPO or clear liquid status due to medical condition      Nutrition Interventions:   Food and/or Nutrient Delivery: Start Oral Diet  Nutrition Education/Counseling: No recommendation at this time  Coordination of Nutrition Care: Continue to monitor while inpatient  Plan of Care discussed with: Patient    Goals:     Goals: PO intake 50% or greater,prior to discharge       Nutrition Monitoring and Evaluation:   Behavioral-Environmental Outcomes: None Identified  Food/Nutrient Intake Outcomes: Food and Nutrient Intake,Diet Advancement/Tolerance  Physical Signs/Symptoms Outcomes: Biochemical Data,Nutrition Focused Physical Findings,Weight    Discharge Planning:     Too soon to determine     Rafita Perez 87, RD, LD  Contact: [phone]

## 2022-05-07 NOTE — H&P
Hospital Medicine History & Physical      PCP: Sherrie Griffin MD    Date of Admission: 5/6/2022    Date of Service: Pt seen/examined on 5/6/2022    Pt seen/examined face to face on and admitted as observation with expected LOS less than two midnights but that can change depending on respose to medical therapy and clinical progress. Chief Complaint:    Chief Complaint   Patient presents with    Fall     fell in driveway last night. . worse throughout day. . pt complains of left hip pain         History Of Present Illness:      67 y.o. female who presented to [facility] with past medical history of hyperlipidemia on presented to the ED after a fall. Patient reports she was walking outside her house and then turned and fell landing on her left side. Patient reported that after the fall she was not able to ambulate due to pain. Pain would go up to 6/10 no known alleviating or exacerbating factor no associate with fever chills loss of conscious hitting her head focal deficits visual changes palpitations chest pain shortness of breath. Patient lives with her , and was told to come to the ED. Past Medical History:          Diagnosis Date    Hyperlipidemia     Influenza A 12/13/2017    Kidney stones     Vision impairment     wears glasses       Past Surgical History:          Procedure Laterality Date    APPENDECTOMY      BREAST SURGERY      mass 1970    CATARACT REMOVAL  left eye    11/2010    CYSTOSCOPY      EYE SURGERY      rt cataract    PITUITARY SURGERY      benign tumor       Medications Prior to Admission:      Prior to Admission medications    Medication Sig Start Date End Date Taking? Authorizing Provider   vitamin B-12 (CYANOCOBALAMIN) 100 MCG tablet Take 50 mcg by mouth daily   Yes Historical Provider, MD   cycloSPORINE (RESTASIS) 0.05 % ophthalmic emulsion 1 drop.     Historical Provider, MD   Cholecalciferol (VITAMIN D3) 5000 units CAPS Take by mouth    Historical Provider, MD       Allergies:  Patient has no known allergies. Social History:          TOBACCO:   reports that she has never smoked. She has never used smokeless tobacco.  ETOH:   reports current alcohol use. E-Cigarettes/Vaping Use     Questions Responses    E-Cigarette/Vaping Use Never User    Start Date     Passive Exposure     Quit Date     Counseling Given     Comments             Family History:      Reviewed in detail, and noncontributory      REVIEW OF SYSTEMS:     Constitutional:  No Fever, No Chills, No Night Sweats  ENT/Mouth:  No Nasal Congestion,  No Hoarseness, No new mouth lesion  Eyes:  No Eye Pain, No Redness, No Discharge  Cardiovascular:  No Chest Pain, No Orthopnea, No Palpitations  Respiratory:  No Cough, No Sputum, No Dyspnea  Gastrointestinal: No Vomiting, No Diarrhea, No abdominal pain  Genitourinary: No Urinary Frequency, No Hematuria, No Urinary pain  Musculoskeletal: + Worsening Arthralgias, + worsening Myalgias  Skin:  No new Skin Lesions, No new skin rash  Neuro:  No new weakness, No new numbness. Psych:  No suicial ideation, No Violence ideation    PHYSICAL EXAM PERFORMED:    /80   Pulse 100   Temp 97.5 °F (36.4 °C) (Oral)   Resp 17   LMP  (LMP Unknown)   SpO2 94%     General appearance:  mild acute distress, appears older than stated age  HEENT:   atraumatic, sclera anicteric, Conjunctivae clear. Frail, malnourished  Neck: Supple,Trachea midline, no goiter  Respiratory:minimal accessory muscle usage, Normal respiratory effort. Clear to auscultation, bilaterally without wheezing  Cardiovascular:  Regular rate and rhythm, capillary refill 2 seconds  Abdomen: Soft, non-tender, non-distended with normal bowel sounds. Musculoskeletal:  No clubbing, cyanosis. Pelvic tenderness mostly left-sided. Skin: turgor normal.  No new rashes or lesions. Neurologic: Alert and oriented x3, no new focal sensory/motor deficits.      Labs:     Recent Labs     05/06/22  1800   WBC 4.9 HGB 13.0   HCT 39.7        Recent Labs     05/06/22  1800   *   K 4.1   CL 95*   CO2 22   BUN 10   CREATININE 0.6   CALCIUM 9.8     Recent Labs     05/06/22  1800   AST 43*   ALT 25   BILITOT 0.9   ALKPHOS 78     No results for input(s): INR in the last 72 hours. Recent Labs     05/06/22  1800   CKTOTAL 404*   TROPONINI <0.01       Urinalysis:      Lab Results   Component Value Date    NITRU Negative 05/06/2022    WBCUA None seen 05/06/2022    BACTERIA 1+ 12/17/2017    RBCUA 11-20 05/06/2022    BLOODU MODERATE 05/06/2022    SPECGRAV 1.015 05/06/2022    GLUCOSEU Negative 05/06/2022       Radiology:     CXR: I have reviewed the CXR with the following interpretation:   Left lower lobe airspace disease  EKG:  I have reviewed the EKG with the following interpretation:   Sinus tachycardia   CT Cervical Spine WO Contrast   Final Result   1. No evidence of an acute fracture or traumatic malalignment involving the   cervical spine   2. Moderate size left pleural effusion partially visualized. Dedicated CT   imaging of the chest would be helpful for further evaluation. CT HIP LEFT WO CONTRAST   Final Result   1. Acute fracture of the left sacral ala. 2. Acute and mildly displaced fracture of the left superior pubic ramus   extending into the pubic symphysis. 3. Acute and mildly displaced fracture of the left inferior pubic ramus. 4. Osteopenia. CT Head WO Contrast   Final Result   No acute hemorrhage given artifact in the posterior fossa. No midline shift. Other findings as described. XR HIP 2-3 VW W PELVIS LEFT   Final Result   No acute abnormality of the hip. RECOMMENDATION:   Given the degree of osteopenia, nondisplaced fractures may be   radiographically occult. If pain or concern for fracture persists, consider   MR imaging. XR CHEST PORTABLE   Final Result   Hazy left lower lobe airspace disease and small left pleural effusion. ASSESSMENT AND PLAN:    Active Hospital Problems    Diagnosis Date Noted    Pubic ramus fracture, left, closed, initial encounter [facility]) [place] 05/06/2022     Priority: Medium     Fall:  Etiology mechanical  EKG sinus tachycardia   Continue telemetry  PT OT    Moderate size left pleural effusion:  Etiology unclear  CT chest, echo to rule out cardiac  Defer to primary for thoracentesis for further diagnostic work-up    Acute mildly displaced left superior pubic ramus extending into the pubic symphysis and acute mildly displaced fracture of the left inferior pubic rami,  Orthopedic consulted, much appreciated  Hyponatremia:  Urine OSM, Urine lites    Hyperlipidemia: Continue home medication    Malnutrition: Nutrition consulted    Hematuria: Outpatient follow-up    Diet: NPO except meds ordered    DVT Prophylaxis: Held    Dispo:   Expected LOS greater than two [address] [facility], DO

## 2022-05-07 NOTE — PROGRESS NOTES
Physical Therapy  Facility/Department: [place] [address] - MED SURG/ORTHO  Physical Therapy Initial Assessment and Treatment    Name: José Luis Wallace  : 1945  MRN: [id number]  Date of Service: 2022    Discharge Recommendations:  Subacute/Skilled Nursing Facility   PT Equipment Recommendations  Equipment Needed: No  Other: defer to facility      Patient Diagnosis(es): The encounter diagnosis was Multiple closed fractures of pelvis with stable disruption of pelvic ring, initial encounter ([facility] [address] Past Medical History:  has a past medical history of Hyperlipidemia, Influenza A, Kidney stones, and Vision impairment. Past Surgical History:  has a past surgical history that includes eye surgery; pituitary surgery; Cystoscopy; Breast surgery; Appendectomy; and Cataract removal (left eye    2010). If pt is unable to be seen after this session, please let this note serve as discharge summary. Please see case management note for discharge disposition. Thank you. Barriers to home discharge:   [x] Steps to access home entry or bed/bath: 16 NABIL   [x] Reported available assist at home upon discharge limited   [x] Patient or family requests DC to other than home    [x] Other: left pubic ramus fracture    Assessment   Body Structures, Functions, Activity Limitations Requiring Skilled Therapeutic Intervention: Decreased functional mobility ; Decreased cognition;Decreased endurance;Decreased ADL status; Decreased strength;Decreased safe awareness;Decreased balance;Decreased high-level IADLs;Decreased posture; Increased pain  Assessment: Patient is a 67year old female who presented to [facility] on 22 with left pubic ramus fracture. Orthopedics was consulted and they recommended non-operative management and WBAT LLE. Patient is normally independent with functional mobility. Today the patient required maximum assistance x 2 for bed mobility. Patient compelted her exercises with guidance.  Patient is below her prior level of function and would benefit from skilled PT plan of care. PT recommends that this patient receive skilled PT in the SNF setting, when medically stable, in order to address her therapy deficits and maximize her safety and independence with functional mobility. PT to continue to follow. Treatment Diagnosis: decreased independence with functional mobility. Specific Instructions for Next Treatment: progress mobility as tolerated  Therapy Prognosis: Good  Decision Making: Medium Complexity  Barriers to Learning: impaired cognition  Requires PT Follow-Up: Yes  Activity Tolerance  Activity Tolerance: Patient limited by fatigue;Patient limited by pain;Treatment limited secondary to decreased cognition;Patient limited by endurance  Activity Tolerance Comments: Vitals: 148/87  95 BPM 98% on room air. Patient educated on the benefits of increased mobility, weight bearing status WBAT LLE, use of call bell, benefits of exercises. Plan   Plan  Plan: 3-5 times per week  Plan weeks: 1 week 5/14/22  Specific Instructions for Next Treatment: progress mobility as tolerated  Current Treatment Recommendations: Strengthening,Balance training,Functional mobility training,Transfer training,Endurance training,Gait training,Safety education & training,Patient/Caregiver education & training,Equipment evaluation, education, & procurement,Therapeutic activities  Safety Devices  Type of Devices: Bed alarm in place,All bing prominences offloaded,Heels elevated for pressure relief,Left in bed,Nurse notified     Restrictions  Restrictions/Precautions  Restrictions/Precautions: Fall Risk,General Precautions  Lower Extremity Weight Bearing Restrictions  Left Lower Extremity Weight Bearing: Weight Bearing As Tolerated  Position Activity Restriction  Other position/activity restrictions:  Up with assist. WBAT     Subjective   Pain: 2/10 in R hip at rest with pt reporting increased pain during mobility  General  Chart Reviewed: Yes  Patient assessed for rehabilitation services?: Yes  Additional Pertinent Hx: HPI per chart, \"70 y.o. female who presented to [facility] with past medical history of hyperlipidemia on presented to the ED after a fall. Patient reports she was walking outside her house and then turned and fell landing on her left side. Patient reported that after the fall she was not able to ambulate due to pain. Pain would go up to 6/10 no known alleviating or exacerbating factor no associate with fever chills loss of conscious hitting her head focal deficits visual changes palpitations chest pain shortness of breath. Patient lives with her , and was told to come to the ED. Per ED note Dr. Tanika Patton discussed with Dr Guerrero of orthopedics and he states injuries are nonoperative. Patient admitted to the hospitalist service with plan for PT/OT eval in the morning and possible rehab placement. CT C-spine1. No evidence of an acute fracture or traumatic malalignment involving thecervical spine. \"  Response To Previous Treatment: Not applicable  Family / Caregiver Present: No  Referring Practitioner: Jeff Castorena DO  Referral Date : 05/07/22  General Comment  Comments: Supine in bed upon entry of therapy staff. Cleared for therapy by RN. Subjective  Subjective: Patient agreed to participate.          Social/Functional History  Social/Functional History  Lives With: Spouse ( available 24/7 however pt reporting he would not be able to physically assist)  Type of Home: House  Home Layout: Two level,1/2 bath on main level  Home Access: Stairs to enter with rails  Entrance Stairs - Number of Steps: 17 NABIL  Entrance Stairs - Rails: Both  Bathroom Shower/Tub: Tub/Shower unit  Bathroom Toilet: Standard  Home Equipment: Cane,Walker, rolling (does not use AD)  Has the patient had two or more falls in the past year or any fall with injury in the past year?: Yes (\"at least 1 fall every one and a half months)  Receives Help From: Family (daughter assists with IADLs PRN)  ADL Assistance: Independent  Homemaking Assistance: Independent  Homemaking Responsibilities: No  Ambulation Assistance: Independent  Transfer Assistance: Independent  Active : Yes  Mode of Transportation: Car  Occupation: Retired  Leisure & Hobbies: Travel  Vision/Hearing  Vision Exceptions: Wears glasses for reading  Hearing: Within functional limits    Cognition   Orientation  Overall Orientation Status: Within Normal Limits  Orientation Level: Oriented X4  Cognition  Overall Cognitive Status: Exceptions  Arousal/Alertness: Appropriate responses to stimuli  Following Commands: Follows multistep commands with repitition; Follows multistep commands with increased time  Attention Span: Attends with cues to redirect  Safety Judgement: Decreased awareness of need for safety  Problem Solving: Decreased awareness of errors  Insights: Decreased awareness of deficits  Initiation: Requires cues for some  Sequencing: Requires cues for some  Cognition Comment: frequent cueing to stay focused on task and for safety. patient also was anxious during therapy evaluation. Objective   Pulse: 92  Heart Rate Source: Monitor  BP: (!) 148/87  BP Location: Right upper arm  BP Method: Automatic  Patient Position: Semi fowlers  MAP (Calculated): 107.33  Resp: 16  SpO2: 92 %  O2 Device: None (Room air)     Observation/Palpation  Posture: Fair  Observation: Lateral lean to R while seated to offload weight on left hip d/t pain        PROM RLE (degrees)  RLE PROM: WFL  AROM RLE (degrees)  RLE General AROM: decreased right hip flexion. otherwise WFL  PROM LLE (degrees)  LLE General PROM: limited hip flexion/extension, knee flexion/extension due to pain/weakness. ankle WFL  AROM LLE (degrees)  LLE General AROM: limited hip flexion/extension, knee flexion/extension due to pain/weakness.  ankle [facility]  Strength RLE  Strength RLE: Exception  R Hip Flexion: 3-/5  R Knee Flexion: 3+/5  R Knee Extension: 3+/5  R Ankle Dorsiflexion: 3+/5  R Ankle Plantar flexion: 3+/5  Strength LLE  Strength LLE: Exception  L Hip Flexion: 2+/5  L Knee Flexion: 3-/5  L Knee Extension: 3-/5  L Ankle Dorsiflexion: 3+/5  L Ankle Plantar Flexion: 3+/5           Bed mobility  Rolling to Left: Maximum assistance  Rolling to Right: Maximum assistance  Supine to Sit: Maximum assistance;2 Person assistance  Sit to Supine: Maximum assistance;2 Person assistance  Scooting: Dependent/Total;2 Person assistance (to scoot up in bed)  Bed Mobility Comments: head of bed elevated. Transfers  Sit to Stand: Unable to assess  Stand to sit: Unable to assess  Bed to Chair: Unable to assess  Comment: patient refused to attempt due to increased pain, fatigue. Ambulation  More Ambulation?: No     Balance  Posture: Poor  Sitting - Static: Poor  Sitting - Dynamic: Poor  Comments: significant lean to the right. moderate assist to correct. Exercise Treatment: 1 x 10 bilateral AROM ankle pumps, right long arc quads. A/AROM Exercises: 1 x 10 left long arc quad, right hip flexion. patient declined left hip flexion due to pain. AM-PAC Score     AM-PAC Inpatient Mobility without Stair Climbing Raw Score : 7 (05/07/22 1255)  AM-PAC Inpatient without Stair Climbing T-Scale Score : 28.66 (05/07/22 1255)  Mobility Inpatient CMS 0-100% Score: 86.29 (05/07/22 1255)  Mobility Inpatient without Stair CMS G-Code Modifier : CM (05/07/22 1255)       Goals  Short Term Goals  Time Frame for Short term goals: 1 week 5/14/22  Short term goal 1: Supine <> sit with moderate assistance. Short term goal 2: Sit <> stand with mod assist and least restrictive assistive device. Short term goal 3: Bed <> chair with mod assist and LRAD. Short term goal 4: Ambulate 10 feet with mod assist and LRAD. Short term goal 5: By 5/11/22 patient will tolerate 12-15 reps of her exercises to maximize her strength and endurance. Patient Goals   Patient goals : To go home.  to decrease her pain       Therapy Time   Individual Concurrent Group Co-treatment   Time In 1146         Time Out 1204         Minutes 18         Timed Code Treatment Minutes: 8 Minutes (10 minute evaluation)       Sergio Meléndez, PT

## 2022-05-07 NOTE — ED PROVIDER NOTES
CHIEF COMPLAINT  Fall (fell in driveway last night. . worse throughout day. . pt complains of left hip pain )      HISTORY OF PRESENT ILLNESS  Raheel Montes De Oca is a 67 y.o. female with a history of dyslipidemia who presents to the ED complaining of left hip pain following fall. Patient lost her balance and fell yesterday evening. Subsequent to the fall she has been unable to ambulate. Spent the night on the couch and then today was still unable to ambulate so her  convinced her to come to the ER. Aside from her left hip pain she denies any other injuries. Does not take blood thinners. No report of recent illness. No other complaints, modifying factors or associated symptoms. I have reviewed the following from the nursing documentation. Past Medical History:   Diagnosis Date    Hyperlipidemia     Influenza A 12/13/2017    Kidney stones     Vision impairment     wears glasses     Past Surgical History:   Procedure Laterality Date    APPENDECTOMY      BREAST SURGERY      mass 1970    CATARACT REMOVAL  left eye    11/2010    CYSTOSCOPY      EYE SURGERY      rt cataract    PITUITARY SURGERY      benign tumor     History reviewed. No pertinent family history.   Social History     Socioeconomic History    Marital status:      Spouse name: Not on file    Number of children: Not on file    Years of education: Not on file    Highest education level: Not on file   Occupational History    Not on file   Tobacco Use    Smoking status: Never Smoker    Smokeless tobacco: Never Used   Vaping Use    Vaping Use: Never used   Substance and Sexual Activity    Alcohol use: Yes     Comment: 2 drinks/day    Drug use: No    Sexual activity: Not on file   Other Topics Concern    Not on file   Social History Narrative    Not on file     Social Determinants of Health     Financial Resource Strain:     Difficulty of Paying Living Expenses: Not on file   Food Insecurity:     Worried About Running Out of Food in the Last Year: Not on file    Ran Out of Food in the Last Year: Not on file   Transportation Needs:     Lack of Transportation (Medical): Not on file    Lack of Transportation (Non-Medical):  Not on file   Physical Activity:     Days of Exercise per Week: Not on file    Minutes of Exercise per Session: Not on file   Stress:     Feeling of Stress : Not on file   Social Connections:     Frequency of Communication with Friends and Family: Not on file    Frequency of Social Gatherings with Friends and Family: Not on file    Attends [employer] Services: Not on file    Active Member of [address] [place] [address] [employer] or Organizations: Not on file    Attends Club or Organization Meetings: Not on file    Marital Status: Not on file   Intimate Partner Violence:     Fear of Current or Ex-Partner: Not on file    Emotionally Abused: Not on file    Physically Abused: Not on file    Sexually Abused: Not on file   Housing Stability:     Unable to Pay for Housing in the Last Year: Not on file    Number of [place] in the Last Year: Not on file    Unstable Housing in the Last Year: Not on file     Current Facility-Administered Medications   Medication Dose Route Frequency Provider Last Rate Last Admin    0.9 % sodium chloride infusion  1,000 mL IntraVENous Continuous Inge Lombard,  mL/hr at 05/06/22 1827 1,000 mL at 05/06/22 1827    sodium chloride flush 0.9 % injection 10 mL  10 mL IntraVENous 2 times per day Naty Fountain,         sodium chloride flush 0.9 % injection 10 mL  10 mL IntraVENous PRN Naty Fountain DO        0.9 % sodium chloride infusion   IntraVENous PRN Chelsea Hay Fountain DO        potassium chloride 10 mEq/100 mL IVPB (Peripheral Line)  10 mEq IntraVENous PRN Chelsea Hay Fountain DO        magnesium sulfate 2000 mg in 50 mL IVPB premix  2,000 mg IntraVENous PRN Jose Fountain DO        [START ON 5/7/2022] enoxaparin (LOVENOX) injection 40 mg  40 mg SubCUTAneous Daily Jose Fountain DO  promethazine (PHENERGAN) tablet 12.5 mg  12.5 mg Oral Q6H PRN Ahmad A Essie, DO        Or    ondansetron (ZOFRAN) injection 4 mg  4 mg IntraVENous Q6H PRN Maxalec Drummond Essie, DO        acetaminophen (TYLENOL) tablet 650 mg  650 mg Oral Q6H PRN Ahmad A Essie, DO        Or    acetaminophen (TYLENOL) suppository 650 mg  650 mg Rectal Q6H PRN Jose Cruz Gallagher A Essie, DO        oxyCODONE (ROXICODONE) immediate release tablet 5 mg  5 mg Oral Q4H PRN Ahmad A Essie, DO        oxyCODONE (ROXICODONE) immediate release tablet 10 mg  10 mg Oral Q4H PRN Ahmad A Essie, DO         No Known Allergies    REVIEW OF SYSTEMS  10 systems reviewed, pertinent positives per HPI otherwise noted to be negative. PHYSICAL EXAM  /80   Pulse 100   Temp 97.5 °F (36.4 °C) (Oral)   Resp 17   LMP  (LMP Unknown)   SpO2 94%   GENERAL APPEARANCE: Awake and alert. Cooperative. Appears elderly, frail, uncomfortable but nontoxic. HEAD: Normocephalic. Atraumatic. EYES: PERRL. EOM's grossly intact. ENT: Mucous membranes are moist.   NECK: Supple, trachea midline. No midline tenderness. HEART: RRR. Normal S1, S2. No murmurs, rubs or gallops. LUNGS: Respirations unlabored. CTAB. Good air exchange. No wheezes, rales, or rhonchi. Speaking comfortably in full sentences. ABDOMEN: Soft. Non-distended. Non-tender. No guarding or rebound. Normal Bowel sounds. EXTREMITIES: No peripheral edema. No acute deformities. Generalized left hip tenderness. Left lower extremity range of motion limited due to pain. No shortening or malrotation noted. SKIN: Warm and dry. No acute rashes. Small area of ecchymosis over the dorsal aspect of the left hand and wrist.  NEUROLOGICAL: Alert and oriented X 3. CN II-XII intact. No gross facial drooping. Strength 5/5, sensation intact. No pronator drift. Normal coordination. PSYCHIATRIC: Normal mood and affect. LABS  I have reviewed all labs for this visit.    Results for orders placed or performed during the hospital encounter of 05/06/22   CBC with Auto Differential   Result Value Ref Range    WBC 4.9 4.0 - 11.0 K/uL    RBC 4.44 4.00 - 5.20 M/uL    Hemoglobin 13.0 12.0 - 16.0 g/dL    Hematocrit 39.7 36.0 - 48.0 %    MCV 89.5 80.0 - 100.0 fL    MCH 29.2 26.0 - 34.0 pg    MCHC 32.6 31.0 - 36.0 g/dL    RDW 14.5 12.4 - 15.4 %    Platelets 117 224 - 111 K/uL    MPV 7.0 5.0 - 10.5 fL    Neutrophils % 76.1 %    Lymphocytes % 17.0 %    Monocytes % 2.2 %    Eosinophils % 3.9 %    Basophils % 0.8 %    Neutrophils Absolute 3.7 1.7 - 7.7 K/uL    Lymphocytes Absolute 0.8 (L) 1.0 - 5.1 K/uL    Monocytes Absolute 0.1 0.0 - 1.3 K/uL    Eosinophils Absolute 0.2 0.0 - 0.6 K/uL    Basophils Absolute 0.0 0.0 - 0.2 K/uL   Comprehensive Metabolic Panel w/ Reflex to MG   Result Value Ref Range    Sodium 130 (L) 136 - 145 mmol/L    Potassium reflex Magnesium 4.1 3.5 - 5.1 mmol/L    Chloride 95 (L) 99 - 110 mmol/L    CO2 22 21 - 32 mmol/L    Anion Gap 13 3 - 16    Glucose 110 (H) 70 - 99 mg/dL    BUN 10 7 - 20 mg/dL    CREATININE 0.6 0.6 - 1.2 mg/dL    GFR Non-African American >60 >60    GFR African American >60 >60    Calcium 9.8 8.3 - 10.6 mg/dL    Total Protein 7.2 6.4 - 8.2 g/dL    Albumin 3.9 3.4 - 5.0 g/dL    Albumin/Globulin Ratio 1.2 1.1 - 2.2    Total Bilirubin 0.9 0.0 - 1.0 mg/dL    Alkaline Phosphatase 78 40 - 129 U/L    ALT 25 10 - 40 U/L    AST 43 (H) 15 - 37 U/L   CK   Result Value Ref Range    Total  (H) 26 - 192 U/L   Troponin   Result Value Ref Range    Troponin <0.01 <0.01 ng/mL   Urinalysis with Reflex to Culture    Specimen: Urine   Result Value Ref Range    Color, UA DARK YELLOW (A) Straw/Yellow    Clarity, UA Clear Clear    Glucose, Ur Negative Negative mg/dL    Bilirubin Urine Negative Negative    Ketones, Urine 15 (A) Negative mg/dL    Specific Gravity, UA 1.015 1.005 - 1.030    Blood, Urine MODERATE (A) Negative    pH, UA 7.5 5.0 - 8.0    Protein, UA Negative Negative mg/dL    Urobilinogen, Urine 1.0 <2.0 E.U./dL    Nitrite, Urine Negative Negative    Leukocyte Esterase, Urine Negative Negative    Microscopic Examination YES     Urine Type NotGiven     Urine Reflex to Culture Not Indicated    Microscopic Urinalysis   Result Value Ref Range    WBC, UA None seen 0 - 5 /HPF    RBC, UA 11-20 (A) 0 - 4 /HPF   EKG 12 Lead   Result Value Ref Range    Ventricular Rate 103 BPM    Atrial Rate 103 BPM    P-R Interval 194 ms    QRS Duration 70 ms    Q-T Interval 304 ms    QTc Calculation (Bazett) 398 ms    P Axis 46 degrees    R Axis 30 degrees    T Axis -31 degrees    Diagnosis       Sinus tachycardiaNonspecific T wave abnormalityAbnormal ECGWhen compared with ECG of 11-MAR-2021 17:04,Nonspecific T wave abnormality now evident in Inferior leadsNonspecific T wave abnormality now evident in Lateral leadsQT has shortened       EKG  Sinus tachycardia, rate 103, normal axis, QTC within normal limits, no acute ST or T wave changes from prior on March 11, 2021      RADIOLOGY  X-RAYS:  I have reviewed radiologic plain film image(s). ALL OTHER NON-PLAIN FILM IMAGES SUCH AS CT, ULTRASOUND AND MRI HAVE BEEN READ BY THE RADIOLOGIST. CT HIP LEFT WO CONTRAST   Final Result   1. Acute fracture of the left sacral ala. 2. Acute and mildly displaced fracture of the left superior pubic ramus   extending into the pubic symphysis. 3. Acute and mildly displaced fracture of the left inferior pubic ramus. 4. Osteopenia. CT Head WO Contrast   Final Result   No acute hemorrhage given artifact in the posterior fossa. No midline shift. Other findings as described. XR HIP 2-3 VW W PELVIS LEFT   Final Result   No acute abnormality of the hip. RECOMMENDATION:   Given the degree of osteopenia, nondisplaced fractures may be   radiographically occult. If pain or concern for fracture persists, consider   MR imaging.          XR CHEST PORTABLE   Final Result   Hazy left lower lobe airspace disease and small left pleural effusion. CT Cervical Spine WO Contrast    (Results Pending)              Rechecks: Physical assessment performed. Appears nontoxic    ED COURSE/MDM  Patient seen and evaluated. Old records reviewed. Labs and imaging reviewed and results discussed with patient and her . Patient with ground-level fall yesterday evening resulting in multiple left-sided pelvic fractures. She has been unable to ambulate since the time of injury. On reevaluation she reports some neck pain. We will obtain CT of the cervical spine. Case discussed with Dr Dom Quezada of orthopedics and he states injuries are nonoperative. Patient admitted to the hospitalist service with plan for PT/OT eval in the morning and possible rehab placement. Current Discharge Medication List          CLINICAL IMPRESSION  1. Multiple closed fractures of pelvis with stable disruption of pelvic ring, initial encounter ([facility])        Blood pressure 133/80, pulse 100, temperature 97.5 °F (36.4 °C), temperature source Oral, resp. rate 17, SpO2 94 %, not currently breastfeeding. DISPOSITION  Nayeli Jacobo was admitted in stable condition.         Leonard Cantu MD  05/06/22 9993

## 2022-05-07 NOTE — PLAN OF CARE
Bed in lowest position, wheels locked, 2/4 side rails up, nonskid footwear on. Bed/ chair check alarm in place, call light within reach. Pt instructed to call out when needing assistance. Pt stated understanding. Nurse will continue to monitor.       Problem: Discharge Planning  Goal: Discharge to home or other facility with appropriate resources  Outcome: Progressing     Problem: Safety - Adult  Goal: Free from fall injury  Outcome: Progressing

## 2022-05-07 NOTE — PROGRESS NOTES
4 Eyes Skin Assessment     The patient is being assess for   Admission    I agree that 2 RN's have performed a thorough Head to Toe Skin Assessment on the patient. ALL assessment sites listed below have been assessed. Areas assessed for pressure by both nurses:   [x]   Head, Face, and Ears   [x]   Shoulders, Back, and Chest, Abdomen  [x]   Arms, Elbows, and Hands   [x]   Coccyx, Sacrum, and Ischium  [x]   Legs, Feet, and Heels      Scattered bruising t/o, BL heels red/blanchable, coccyx red blanchable  Skin Assessed Under all Medical Devices by both nurses:  All Mepilex Borders were peeled back and area peeked at by both nurses:       SHARE this note so that the co-signing nurse is able to place an eSignature    Co-signer eSignature: Electronically signed by Dora Stanton RN on 5/7/22 at 5:52 AM EDT    Does the Patient have Skin Breakdown related to pressure?   No              Natan Prevention initiated:  No   Wound Care Orders initiated:  No      WOC nurse consulted for Pressure Injury (Stage 3,4, Unstageable, DTI, NWPT, Complex wounds)and New or Established Ostomies:  No      Primary Nurse eSignature: Electronically signed by Fer Calvillo RN on 5/7/22 at 3:16 AM EDT

## 2022-05-07 NOTE — PROGRESS NOTES
Hospitalist Progress Note      PCP: Alexey Heredia MD    Date of Admission: 5/6/2022    Chief Complaint: [facility] Course: H&P reviewed. Patient admitted with pelvic fractures s/p mechanical fall    Subjective:     Patient is a poor historian however reports pain in her left hip with movement. Denies any shortness of breath, fever, chills or chest pain      Medications:  Reviewed    Infusion Medications    sodium chloride 1,000 mL (05/07/22 0408)    sodium chloride       Scheduled Medications    potassium chloride  40 mEq Oral Once    sodium chloride flush  10 mL IntraVENous 2 times per day    [Held by provider] enoxaparin  40 mg SubCUTAneous Daily     PRN Meds: sodium chloride flush, sodium chloride, potassium chloride, magnesium sulfate, promethazine **OR** ondansetron, acetaminophen **OR** acetaminophen, oxyCODONE, oxyCODONE      Intake/Output Summary (Last 24 hours) at 5/7/2022 1225  Last data filed at 5/7/2022 1022  Gross per 24 hour   Intake --   Output 500 ml   Net -500 ml       Physical Exam Performed:    BP (!) 148/87   Pulse 92   Temp 97.7 °F (36.5 °C) (Temporal)   Resp 16   Ht 5' 3\" (1.6 m)   Wt 102 lb 8.2 oz (46.5 kg)   LMP  (LMP Unknown)   SpO2 92%   BMI 18.16 kg/m²     General appearance: No apparent distress, appears stated age  HEENT: Pupils equal, round,Conjunctivae/corneas clear. Neck: Supple, with full range of motion. No jugular venous distention. Trachea midline. Respiratory:  Normal respiratory effort. Clear to auscultation, bilaterally without Rales/Wheezes/Rhonchi. Cardiovascular: Regular rate and rhythm with normal S1/S2 without murmurs, rubs or gallops. Abdomen: Soft, non-tender, non-distended with normal bowel sounds. Musculoskeletal: No clubbing, cyanosis or edema bilaterally.   Limited range of motion in her left hip due to pain  Skin: Warm and dry  Psychiatric: Alert , not anxious appearing  Neuro: Limited due to poor cooperation from the patient      Labs:   Recent Labs     05/06/22  1800 05/07/22  0558   WBC 4.9 4.8   HGB 13.0 12.6   HCT 39.7 37.2    168     Recent Labs     05/06/22  1800 05/07/22  0558   * 135*   K 4.1 4.1   CL 95* 98*   CO2 22 23   BUN 10 6*   CREATININE 0.6 0.6   CALCIUM 9.8 8.7     Recent Labs     05/06/22  1800 05/07/22  0558   AST 43* 30   ALT 25 22   BILITOT 0.9 0.9   ALKPHOS 78 82     No results for input(s): INR in the last 72 hours. Recent Labs     05/06/22  1800 05/07/22  0558   CKTOTAL 404*  --    TROPONINI <0.01 <0.01       Urinalysis:      Lab Results   Component Value Date    NITRU Negative 05/06/2022    WBCUA None seen 05/06/2022    BACTERIA 1+ 12/17/2017    RBCUA 11-20 05/06/2022    BLOODU MODERATE 05/06/2022    SPECGRAV 1.015 05/06/2022    GLUCOSEU Negative 05/06/2022       Radiology:  CT Cervical Spine WO Contrast   Final Result   1. No evidence of an acute fracture or traumatic malalignment involving the   cervical spine   2. Moderate size left pleural effusion partially visualized. Dedicated CT   imaging of the chest would be helpful for further evaluation. CT HIP LEFT WO CONTRAST   Final Result   1. Acute fracture of the left sacral ala. 2. Acute and mildly displaced fracture of the left superior pubic ramus   extending into the pubic symphysis. 3. Acute and mildly displaced fracture of the left inferior pubic ramus. 4. Osteopenia. CT Head WO Contrast   Final Result   No acute hemorrhage given artifact in the posterior fossa. No midline shift. Other findings as described. XR HIP 2-3 VW W PELVIS LEFT   Final Result   No acute abnormality of the hip. RECOMMENDATION:   Given the degree of osteopenia, nondisplaced fractures may be   radiographically occult. If pain or concern for fracture persists, consider   MR imaging. XR CHEST PORTABLE   Final Result   Hazy left lower lobe airspace disease and small left pleural effusion.          CT CHEST WO CONTRAST    (Results Pending)           Assessment/Plan:    Active Hospital Problems    Diagnosis     Pubic ramus fracture, left, closed, initial encounter [facility]) [W88.644C]      Priority: Medium     Fall: likely  Mechanical. EKG sinus tachycardia   Continue telemetry  PT OT     ?left pleural effusion: small on CXR. Etiology unclear. Pt asymptomatic. Sating well on RA. CT chest pending. Cardiac  echo to rule out cardiac cause pending. Check pro BNP        Acute mildly displaced left superior pubic ramus extending into the pubic symphysis and acute mildly displaced fracture of the left inferior pubic rami. Ortho consulted- appreciate hep. Conservative management. Weight bearing as tolerated. PT/OT        Hyponatremia:likely vol depletion. Improved with IVF. Malnutrition: dietician consulted      Microscopic hematuria: chronic. Outpatient follow-up       DVT Prophylaxis: lovenox   Diet: Diet NPO Exceptions are: Ice Chips, Sips of Water with Meds. Start diet as no surgery planned   Code Status: Full Code    PT/OT Eval Status: ordered, rec SNF.  JONNATHAN to assist     Dispo - likely to SNF when arrangement completed by JONNATHAN Nunez MD

## 2022-05-07 NOTE — PROGRESS NOTES
RN messaged provider \"Pt's potassium level were corrected by lab. The initial machine used was off this morning. K level is 4.1. I will hold the one time dose unless you state otherwise. Thank you. \"    Katia Khan RN

## 2022-05-07 NOTE — PROGRESS NOTES
Occupational Therapy  Facility/Department: [facility] C5 - MED SURG/ORTHO  Occupational Therapy Initial Assessment    Name: Meseret Cornejo  : 1945  MRN: [id number]  Date of Service: 2022    Discharge Recommendations:  Subacute/Skilled Nursing Facility  OT Equipment Recommendations  Other: defer       Patient Diagnosis(es): The encounter diagnosis was Multiple closed fractures of pelvis with stable disruption of pelvic ring, initial encounter ([facility] [address] Past Medical History:  has a past medical history of Hyperlipidemia, Influenza A, Kidney stones, and Vision impairment. Past Surgical History:  has a past surgical history that includes eye surgery; pituitary surgery; Cystoscopy; Breast surgery; Appendectomy; and Cataract removal (left eye    2010). Assessment   Performance deficits / Impairments: Decreased functional mobility ; Decreased ADL status; Decreased strength;Decreased safe awareness;Decreased balance;Decreased endurance  Assessment: Pt is a 66 yo female admitted to [facility] d/t a fall resulting in L pelvis fracture. Pt is WBAT with use of RW. PTA, was retired and living with her  in a 2 story house with main bed and bath on second level and 17 NABIL. Pt reports being IND with ADLs and functional mobility at baseline. After evaluation and treatment, pt found to be presenting with the above mentioned deficits. Pt is currently functioning below baseline and is mod A for UB ADLs d/t poor sitting balance, dependent for LB ADLs, and max Ax2 for bed mobility. Pt would benefit from continued skilled occupational therapy to address these deficits, increasing safety and independence with ADL and functional mobility. Will continue to assess for discharge needs. Anticipate SNF at discharge d/t home discharge barriers.     Barriers to home discharge:   [x] Steps to access home entry or bed/bath: 17 NABIL   [] No rail with steps to access home entry or bed/bath   [x] Reported available assist at home upon discharge limited:  available but unable to provide physical assist   [] Patient or family requests DC to other than home    [] Patient reports expectation of post acute Discharge disposition to other than home   [] Other:      Prognosis: Fair  Decision Making: Medium Complexity  REQUIRES OT FOLLOW-UP: Yes        Plan   Plan  Times per Week: 3-5x  Current Treatment Recommendations: Strengthening,Balance training,Functional mobility training,Endurance training,Wheelchair mobility training,Pain management,Safety education & training,Patient/Caregiver education & training,Positioning,Equipment evaluation, education, & procurement,Modalities,Self-Care / ADL,Home management training     Restrictions  Restrictions/Precautions  Restrictions/Precautions: Fall Risk,General Precautions  Position Activity Restriction  Other position/activity restrictions: Up with assist. WBAT    Subjective   General  Chart Reviewed: Yes,Orders,Progress Notes,History and Physical,Operative [place]  Patient assessed for rehabilitation services?: Yes  Response to previous treatment: Patient with no complaints from previous session  Family / Caregiver Present: No  Referring Practitioner: Omar Kennedy DO  Diagnosis: Pubic ramus fracture, Left, closed intitial encounter  Subjective  Subjective: Pt presented supine in bed and reporting \"I am so hungry. I cannot do anything. \" After encouragement, pt willing to participate in therapy evaluation  General Comment  Comments: RN cleared pt for therapy  Pain: 2/10 in R hip at rest with pt reporting increased pain during mobility  Social/Functional History  Social/Functional History  Lives With: Spouse ( available 24/7 however pt reporting he would not be able to physically assist)  Type of Home: House  Home Layout: Two level,1/2 bath on main level  Home Access: Stairs to enter with rails  Entrance Stairs - Number of Steps: 17 NABIL  Entrance Stairs - Rails: Both  Bathroom Shower/Tub: Tub/Shower unit  Bathroom Toilet: Standard  Home Equipment: Cane,Walker, rolling (does not use AD)  Has the patient had two or more falls in the past year or any fall with injury in the past year?: Yes (\"at least 1 fall every one and a half months)  Receives Help From: Family (daughter assists with IADLs PRN)  ADL Assistance: Independent  Homemaking Assistance: Independent  Homemaking Responsibilities: No  Ambulation Assistance: Independent  Transfer Assistance: Independent  Active : Yes  Mode of Transportation: Car  Occupation: Retired  Leisure & Hobbies: Travel       Objective   Pulse: 92  Heart Rate Source: Monitor  BP: (!) 148/87  BP Location: Right upper arm  BP Method: Automatic  Patient Position: Semi fowlers  MAP (Calculated): 107.33  Resp: 16  SpO2: 92 %  O2 Device: None (Room air)  Vision Exceptions: Wears glasses for reading  Hearing: Within functional limits       Observation/Palpation  Posture: Fair  Observation: Lateral lean to R while seated to offload weight on left hip d/t pain  Safety Devices  Type of Devices: Bed alarm in place; All bing prominences offloaded; Heels elevated for pressure relief;Left in bed;Nurse notified        AROM: Generally decreased, functional  PROM: Generally decreased, functional  Strength: Generally decreased, functional  Coordination: Generally decreased, functional  Tone: Normal  Sensation: Intact  ADL  Additional Comments: Pt reporting \"14/10\" pain with mobility and declining ADLs at this time                 Cognition  Overall Cognitive Status: WFL  Perception  Overall Perceptual Status: WFL               Education Given To: Patient  Education Provided: Plan of Care;Role of Therapy;Precautions  Education Provided Comments: Disease Specific Education: Pt educated on importance of OOB mobility, prevention of complications of bedrest, and general safety during hospitalization.  Pt verbalized understanding  Education Method: Verbal  Barriers to Learning: None  Education Outcome: Verbalized understanding  LUE AROM (degrees)  LUE AROM : WFL  LUE General AROM: decreased shoulder AROM to ~120* however functional for ADLs  Left Hand AROM (degrees)  Left Hand AROM: WFL  RUE AROM (degrees)  RUE General AROM: decreased shoulder AROM to ~120* however functional for ADLs  Right Hand AROM (degrees)  Right Hand AROM: [facility]          AM-PAC Score  AM-PAC Inpatient Daily Activity Raw Score: 11 (05/07/22 1221)  AM-PAC Inpatient ADL T-Scale Score : 29.04 (05/07/22 1221)  ADL Inpatient CMS 0-100% Score: 70.42 (05/07/22 1221)  ADL Inpatient CMS G-Code Modifier : CL (05/07/22 1221)    Goals  Short Term Goals  Time Frame for Short term goals: 1 week (5/14/22)  Short Term Goal 1: Pt will perform functional transfers with mod Ax2 and use of LRAD  Short Term Goal 2: Pt will tolerate sitting at EOB with SBA for static sitting balance for >5 mins  Short Term Goal 3: Pt will perform x15 reps of BUE exercises in preparation for use of LRAD  Patient Goals   Patient goals : \"No pain\"       Therapy Time   Individual Concurrent Group Co-treatment   Time In 1127         Time Out 1145         Minutes 18         Timed Code Treatment Minutes: 10 Minutes      If pt is unable to be seen after this session, please let this note serve as discharge summary. Please see case management note for discharge disposition. Thank you.       Michele Weaver, OT

## 2022-05-08 PROBLEM — S32.82XA MULTIPLE CLOSED FRACTURES OF PELVIS (HCC): Status: ACTIVE | Noted: 2022-05-08

## 2022-05-08 LAB
A/G RATIO: 1.3 (ref 1.1–2.2)
ALBUMIN SERPL-MCNC: 3.3 G/DL (ref 3.4–5)
ALP BLD-CCNC: 62 U/L (ref 40–129)
ALT SERPL-CCNC: 14 U/L (ref 10–40)
ANION GAP SERPL CALCULATED.3IONS-SCNC: 11 MMOL/L (ref 3–16)
AST SERPL-CCNC: 14 U/L (ref 15–37)
BASOPHILS ABSOLUTE: 0 K/UL (ref 0–0.2)
BASOPHILS RELATIVE PERCENT: 0.8 %
BILIRUB SERPL-MCNC: 0.6 MG/DL (ref 0–1)
BUN BLDV-MCNC: 12 MG/DL (ref 7–20)
CALCIUM SERPL-MCNC: 9.6 MG/DL (ref 8.3–10.6)
CHLORIDE BLD-SCNC: 101 MMOL/L (ref 99–110)
CO2: 22 MMOL/L (ref 21–32)
CREAT SERPL-MCNC: 0.8 MG/DL (ref 0.6–1.2)
EOSINOPHILS ABSOLUTE: 0.3 K/UL (ref 0–0.6)
EOSINOPHILS RELATIVE PERCENT: 5.9 %
GFR AFRICAN AMERICAN: >60
GFR NON-AFRICAN AMERICAN: >60
GLUCOSE BLD-MCNC: 111 MG/DL (ref 70–99)
HCT VFR BLD CALC: 31.6 % (ref 36–48)
HCT VFR BLD CALC: 31.8 % (ref 36–48)
HEMOGLOBIN: 10.7 G/DL (ref 12–16)
HEMOGLOBIN: 10.7 G/DL (ref 12–16)
LYMPHOCYTES ABSOLUTE: 0.7 K/UL (ref 1–5.1)
LYMPHOCYTES RELATIVE PERCENT: 13.7 %
MCH RBC QN AUTO: 29.3 PG (ref 26–34)
MCHC RBC AUTO-ENTMCNC: 33.7 G/DL (ref 31–36)
MCV RBC AUTO: 86.8 FL (ref 80–100)
MONOCYTES ABSOLUTE: 0.2 K/UL (ref 0–1.3)
MONOCYTES RELATIVE PERCENT: 3.2 %
NEUTROPHILS ABSOLUTE: 4.2 K/UL (ref 1.7–7.7)
NEUTROPHILS RELATIVE PERCENT: 76.4 %
PDW BLD-RTO: 14.6 % (ref 12.4–15.4)
PLATELET # BLD: 159 K/UL (ref 135–450)
PMV BLD AUTO: 7.7 FL (ref 5–10.5)
POTASSIUM REFLEX MAGNESIUM: 4.1 MMOL/L (ref 3.5–5.1)
RBC # BLD: 3.64 M/UL (ref 4–5.2)
SODIUM BLD-SCNC: 134 MMOL/L (ref 136–145)
TOTAL PROTEIN: 5.9 G/DL (ref 6.4–8.2)
WBC # BLD: 5.5 K/UL (ref 4–11)

## 2022-05-08 PROCEDURE — 6370000000 HC RX 637 (ALT 250 FOR IP): Performed by: INTERNAL MEDICINE

## 2022-05-08 PROCEDURE — 85018 HEMOGLOBIN: CPT

## 2022-05-08 PROCEDURE — 99222 1ST HOSP IP/OBS MODERATE 55: CPT | Performed by: INTERNAL MEDICINE

## 2022-05-08 PROCEDURE — 80053 COMPREHEN METABOLIC PANEL: CPT

## 2022-05-08 PROCEDURE — 2580000003 HC RX 258: Performed by: INTERNAL MEDICINE

## 2022-05-08 PROCEDURE — G0378 HOSPITAL OBSERVATION PER HR: HCPCS

## 2022-05-08 PROCEDURE — 6360000002 HC RX W HCPCS: Performed by: INTERNAL MEDICINE

## 2022-05-08 PROCEDURE — 85025 COMPLETE CBC W/AUTO DIFF WBC: CPT

## 2022-05-08 PROCEDURE — 36415 COLL VENOUS BLD VENIPUNCTURE: CPT

## 2022-05-08 PROCEDURE — 85014 HEMATOCRIT: CPT

## 2022-05-08 PROCEDURE — 1200000000 HC SEMI PRIVATE

## 2022-05-08 RX ORDER — CYCLOSPORINE 0.5 MG/ML
1 EMULSION OPHTHALMIC 2 TIMES DAILY
Status: DISCONTINUED | OUTPATIENT
Start: 2022-05-08 | End: 2022-05-10 | Stop reason: HOSPADM

## 2022-05-08 RX ORDER — TRAMADOL HYDROCHLORIDE 50 MG/1
50 TABLET ORAL EVERY 6 HOURS PRN
Status: DISCONTINUED | OUTPATIENT
Start: 2022-05-08 | End: 2022-05-10 | Stop reason: HOSPADM

## 2022-05-08 RX ADMIN — SODIUM CHLORIDE, PRESERVATIVE FREE 10 ML: 5 INJECTION INTRAVENOUS at 08:41

## 2022-05-08 RX ADMIN — ACETAMINOPHEN 650 MG: 325 TABLET ORAL at 08:42

## 2022-05-08 RX ADMIN — ACETAMINOPHEN 650 MG: 325 TABLET ORAL at 14:33

## 2022-05-08 RX ADMIN — SODIUM CHLORIDE, PRESERVATIVE FREE 10 ML: 5 INJECTION INTRAVENOUS at 20:21

## 2022-05-08 RX ADMIN — ENOXAPARIN SODIUM 30 MG: 100 INJECTION SUBCUTANEOUS at 08:41

## 2022-05-08 ASSESSMENT — PAIN SCALES - GENERAL
PAINLEVEL_OUTOF10: 3
PAINLEVEL_OUTOF10: 1
PAINLEVEL_OUTOF10: 0

## 2022-05-08 NOTE — CARE COORDINATION
CASE MANAGEMENT INITIAL ASSESSMENT      Reviewed chart and completed assessment with patient:yes   Family present:   Explained Case Management role/services. yes    Primary contact information:Florentin [phone]    Health Care Decision Maker :           Can this person be reached and be able to respond quickly, such as within a few minutes or hours? Yes  Who would be your back-up decision maker? Name Ruben Heath  Phone Number:[phone]    Admit date/status:5/8/22  Diagnosis:Pubic fx   Is this a Readmission?:  No      Insurance:[employer] Medicare   Precert required for SNF: Yes       3 night stay required: No    Living arrangements, Adls, care needs, prior to admission:lives with spouse in a 2 story home with 17 NABIL. IPTA. Active . Durable Medical Equipment at home:  Walker_x_Cane__RTS__ BSC__Shower Chair__  02__ HHN__ CPAP__  BiPap__  Hospital Bed__ W/C___ Other_____    Services in the home and/or outpatient, prior to admission:none    Current PCP:Axel Magallon                                Medications:yes Prescription coverage? Yes Will pt require financial assistance with medications No     Transportation needs: squad     PT/OT recs:SNF    [address] Notification (HEN):needed for SNF    Barriers to discharge:none    Plan/comments:patient very hesitant to go to SNF but after speaking with her  and not being able to stand on own has agreed to SNF. Spouse would like a referral to [facility] and he has already toured building today. Referral made. Patient will need cert. ECOC on chart for MD signature yes.

## 2022-05-08 NOTE — PLAN OF CARE
Pt scoring pain on 0-10 scale. Pain medications given per MAR. Pt instructed to call out when pain level increasing. Call light within reach. Nurse will continue to reassess and monitor.      Problem: Pain  Goal: Verbalizes/displays adequate comfort level or baseline comfort level  Outcome: Progressing

## 2022-05-08 NOTE — DISCHARGE INSTR - COC
Continuity of Care Form    Patient Name: Renee Johnston   :    MRN:  [id number]    Admit date:  2022  Discharge date:  5/10/22    Code Status Order: Full Code   Advance Directives:      Admitting Physician:  Coleman Hagen DO  PCP: Julia Dueñas MD    Discharging Nurse: Zulay Arrington Unit/Room#: 6962/0828-12  Discharging Unit Phone Number: [phone]    Emergency Contact:   Extended Emergency Contact Information  Primary Emergency Contact: Yves Mott REBECA  Address: [address], [address] [facility] Carolyn Alert of [address] Phone: [phone]  Work Phone: [phone]  Relation: Spouse  Secondary Emergency Contact: Betsy Ramirez  Address: [address]  Home Phone: [phone]  Relation: Child    Past Surgical History:  Past Surgical History:   Procedure Laterality Date    APPENDECTOMY      BREAST SURGERY      mass 1970    CATARACT REMOVAL  left eye    2010    CYSTOSCOPY      EYE SURGERY      rt cataract    PITUITARY SURGERY      benign tumor       Immunization History: There is no immunization history on file for this patient.     Active Problems:  Patient Active Problem List   Diagnosis Code    Sepsis ([facility] [address] A41.9    Pubic ramus fracture, left, closed, initial encounter [facility]) S32.592A    Multiple closed fractures of pelvis ([facility] [address] S32.82XA       Isolation/Infection:   Isolation            No Isolation          Patient Infection Status       Infection Onset Added Last Indicated Last Indicated By Review Planned Expiration Resolved Resolved By    None active    Resolved    COVID-19 (Rule Out) 21 COVID-19, Rapid (Ordered)   21 Rule-Out Test Resulted            Nurse Assessment:  Last Vital Signs: BP (!) 109/59   Pulse 87   Temp 98.4 °F (36.9 °C) (Oral)   Resp 16   Ht 5' 3\" (1.6 m)   Wt 102 lb 8.2 oz (46.5 kg)   LMP  (LMP Unknown)   SpO2 95%   BMI 18.16 kg/m²     Last documented pain score (0-10 scale): Pain Level: 1  Last Weight:   Wt Readings from Last 1 Encounters:   05/07/22 102 lb 8.2 oz (46.5 kg)     Mental Status:  oriented    IV Access:  - None    Nursing Mobility/ADLs:  Walking   Assisted  Transfer  Assisted  Bathing  Assisted  Dressing  Assisted  Toileting  Assisted  Feeding  Assisted  Med Admin  Assisted  Med Delivery   whole    Wound Care Documentation and Therapy:        Elimination:  Continence: Bowel: No  Bladder: No  Urinary Catheter: None   Colostomy/Ileostomy/Ileal Conduit: No       Date of Last BM: ***    Intake/Output Summary (Last 24 hours) at 5/8/2022 1533  Last data filed at 5/8/2022 4166  Gross per 24 hour   Intake 120 ml   Output 200 ml   Net -80 ml     I/O last 3 completed shifts:  In: -   Out: 700 [Urine:700]    Safety Concerns:     None    Impairments/Disabilities:      None    Nutrition Therapy:  Current Nutrition Therapy:   - Oral Diet:  General    Routes of Feeding: Oral  Liquids: Thin Liquids  Daily Fluid Restriction: no  Last Modified Barium Swallow with Video (Video Swallowing Test): not done    Treatments at the Time of Hospital Discharge:   Respiratory Treatments: No  Oxygen Therapy:  is not on home oxygen therapy.   Ventilator:    - No ventilator support    Rehab Therapies: Physical Therapy and Occupational Therapy  Weight Bearing Status/Restrictions: No weight bearing restrictions  Other Medical Equipment (for information only, NOT a DME order):  Steady  Other Treatments: None    Patient's personal belongings (please select all that are sent with patient):  None    RN SIGNATURE:  Electronically signed by Edison Lynn RN on 5/10/22 at 2:14 PM EDT    CASE MANAGEMENT/SOCIAL WORK SECTION    Inpatient Status Date: 5/8/22    Readmission Risk Assessment Score:  Readmission Risk              Risk of Unplanned Readmission:  0           Discharging to Facility/ Agency   [place] [address] [address] [place] [address] [address] [place] [address] Facility (if applicable) N/A  Name:  Address:  Dialysis Schedule:  Phone:  Fax:    / signature: Electronically signed by Elia Toussaint RN on 5/10/22 at 1:08 PM EDT    PHYSICIAN SECTION    Prognosis: Fair    Condition at Discharge: Stable    Rehab Potential (if transferring to Rehab): Fair    Recommended Labs or Other Treatments After Discharge: SNF to arrange follow-up with PCP and Ortho as instructed    Physician Certification: I certify the above information and transfer of Jordan Osei  is necessary for the continuing treatment of the diagnosis listed and that she requires [facility] for greater 30 days.      Update Admission H&P: No change in H&P    PHYSICIAN SIGNATURE:  Electronically signed by Reji Jules MD on 5/10/22 at 1:01 PM EDT

## 2022-05-08 NOTE — CONSULTS
Patient - Kaylin Valerio   MRN -  [id number]   [facility] # - [de-identified]   - 1945      Date of Admission -  2022  5:24 PM  Date of evaluation -  2022  Room - 0526/0526-01   Napoleon Marcos MD Primary Care Physician - Bruna Condon MD   Chief Complaint   Pleural effusion  Active Hospital Problem List      Active Hospital Problems    Diagnosis Date Noted    Multiple closed fractures of pelvis [facility]) Mahnaz Velazquez 2022     Priority: Medium    Pubic ramus fracture, left, closed, initial encounter [facility]) Ramandeep Dahl 2022     Priority: Medium     HPI   Kaylin Valerio is a 67 y.o. female admitted for fall. 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 CT scan showed acute fracture of the left sacral ala, acute and mildly displaced fracture of the left superior buttock ramus extended into the public symphysis, acute and mildly displaced fracture of the left inferior pelvic ramus. Also the patient was found to have left pleural effusion on the chest x-ray. The patient is from [facility] who moved to the [place] after she got  around 45 years ago, she left to [place] then came in to [place] around 15 years ago. She never smoked, there is no significant history of cancer in the family. No lung cancer in her family also. She denied any shortness of breath. She denies any weight loss. She states that her appetite is not good but she did not lose weight. She denies night sweating or fever. No cough. CT scan of the chest showed left pleural effusion with surrounding atelectasis. The patient stated that she had fluid around her lungs when she was young after an infection that she acquired in [facility]. The patient was evaluated by Ortho and no plan for surgical intervention.   The patient is waiting evaluation for SNF. Past Medical History         Diagnosis Date    Hyperlipidemia     Influenza A 12/13/2017    Kidney stones     Vision impairment     wears glasses      Past Surgical History           Procedure Laterality Date    APPENDECTOMY      BREAST SURGERY      mass 1970    CATARACT REMOVAL  left eye    11/2010    CYSTOSCOPY      EYE SURGERY      rt cataract    PITUITARY SURGERY      benign tumor     Diet    ADULT DIET; Regular  ADULT ORAL NUTRITION SUPPLEMENT; Breakfast, Lunch, Dinner; Clear Liquid Oral Supplement  Allergies    Lactose intolerance (gi)  Social History     Social History     Socioeconomic History    Marital status:      Spouse name: Not on file    Number of children: Not on file    Years of education: Not on file    Highest education level: Not on file   Occupational History    Not on file   Tobacco Use    Smoking status: Never Smoker    Smokeless tobacco: Never Used   Vaping Use    Vaping Use: Never used   Substance and Sexual Activity    Alcohol use: Yes     Comment: 2 drinks/day    Drug use: No    Sexual activity: Not on file   Other Topics Concern    Not on file   Social History Narrative    Not on file     Social Determinants of Health     Financial Resource Strain:     Difficulty of Paying Living Expenses: Not on file   Food Insecurity:     Worried About Running Out of Food in the Last Year: Not on file    Hoa of Food in the Last Year: Not on file   Transportation Needs:     Lack of Transportation (Medical): Not on file    Lack of Transportation (Non-Medical):  Not on file   Physical Activity:     Days of Exercise per Week: Not on file    Minutes of Exercise per Session: Not on file   Stress:     Feeling of Stress : Not on file   Social Connections:     Frequency of Communication with Friends and Family: Not on file    Frequency of Social Gatherings with Friends and Family: Not on file    Attends [employer] Services: Not on file   [employer]s or Organizations: Not on file    Attends Club or Organization Meetings: Not on file    Marital Status: Not on file   Intimate Partner Violence:     Fear of Current or Ex-Partner: Not on file    Emotionally Abused: Not on file    Physically Abused: Not on file    Sexually Abused: Not on file   Housing Stability:     Unable to Pay for Housing in the Last Year: Not on file    Number of [place] in the Last Year: Not on file    Unstable Housing in the Last Year: Not on file     Family History    History reviewed. No pertinent family history. Stroke in family. No history of lung cancer    ROS    General/Constitutional: No recent loss of weight . Poor appetite. No fever or chills. HENT: Negative. Eyes: Negative. Upper respiratory tract: No nasal stuffiness or post nasal drip. Lower respiratory tract/ lungs: No cough or sputum production. No hemoptysis. Cardiovascular: No palpitations, chest pain or edema. Gastrointestinal: No nausea or vomiting. Neurological: No focal neurological weakness. Extremities: Pain left hip. Musculoskeletal: no complaints  Genitourinary: No complaints. Hematological: Negative. Denies easy buising  Skin: No itching. Meds    Current Medications    potassium chloride  40 mEq Oral Once    enoxaparin  30 mg SubCUTAneous Daily    sodium chloride flush  10 mL IntraVENous 2 times per day     traMADol, sodium chloride flush, sodium chloride, potassium chloride, magnesium sulfate, promethazine **OR** ondansetron, acetaminophen **OR** acetaminophen, oxyCODONE, oxyCODONE  IV Drips/Infusions   sodium chloride       Vitals    Vitals    height is 5' 3\" (1.6 m) and weight is 102 lb 8.2 oz (46.5 kg). Her oral temperature is 98.4 °F (36.9 °C). Her blood pressure is 109/59 (abnormal) and her pulse is 87. Her respiration is 16 and oxygen saturation is 95%.      O2 Flow Rate (L/min): 0 L/min  I/O    Intake/Output Summary (Last 24 hours) at 5/8/2022 3234  Last data filed at 5/8/2022 0958  Gross per 24 hour   Intake 120 ml   Output 200 ml   Net -80 ml     Patient Vitals for the past 96 hrs (Last 3 readings):   Weight   05/07/22 0422 102 lb 8.2 oz (46.5 kg)     Exam   Constitutional: Patient appears underweight, not in distress. Head: Normocephalic and atraumatic. Mouth/Throat: Oropharynx is clear and moist.  No oral thrush. Eyes: Conjunctivae are normal. Pupils are equal, round, and reactive to light. No scleral icterus. Neck: Neck supple. No JVD or tracheal deviation present. Cardiovascular: Regular rate, regular rhythm, S1 and S2 with no murmur. No peripheral edema  Pulmonary/Chest: Normal effort with clear breath sounds on the right. No stridor. No respiratory distress. Decreased breath sounds in the left base  Abdominal: Soft. Bowel sounds audible. No distension or tenderness to palp  Musculoskeletal: Tenderness of left hip  Lymphadenopathy:  No cervical adenopathy. Neurological: Patient is alert and oriented to person, place, and time. Skin: Skin is warm and dry. Labs   ABG  No results found for: PH, PO2, PCO2, HCO3, O2SAT  No results found for: IFIO2, MODE, SETTIDVOL, SETPEEP  CBC  Recent Labs     05/06/22  1800 05/06/22  1800 05/07/22  0558 05/08/22  0544 05/08/22  1431   WBC 4.9  --  4.8 5.5  --    RBC 4.44  --  4.25 3.64*  --    HGB 13.0   < > 12.6 10.7* 10.7*   HCT 39.7   < > 37.2 31.6* 31.8*   MCV 89.5  --  87.5 86.8  --    MCH 29.2  --  29.7 29.3  --    MCHC 32.6  --  33.9 33.7  --    RDW 14.5  --  14.2 14.6  --      --  168 159  --    MPV 7.0  --  7.3 7.7  --     < > = values in this interval not displayed.       BMP  Recent Labs     05/06/22  1800 05/07/22  0558 05/08/22  0544   * 135* 134*   K 4.1 4.1 4.1   CL 95* 98* 101   CO2 22 23 22   BUN 10 6* 12   CREATININE 0.6 0.6 0.8   GLUCOSE 110* 112* 111*   MG  --  2.00  --    CALCIUM 9.8 8.7 9.6     LFT  Recent Labs     05/06/22  1800 05/07/22  0558 05/08/22  0544   AST 43* 30 14*   ALT 25 22 14   BILITOT 0.9 0.9 0.6   ALKPHOS 78 82 62     TROP  No results found for: TROPONINT  BNP  Lab Results   Component Value Date    PROBNP 630 05/07/2022     D-Dimer  No results found for: DDIMER  Lactic Acid  No results for input(s): LACTA in the last 72 hours. INR  No results for input(s): INR, PROTIME in the last 72 hours. PTT  No results for input(s): APTT in the last 72 hours. Glucose  No results for input(s): POCGLU in the last 72 hours. UA   Recent Labs     05/06/22  1858   SPECGRAV 1.015   PHUR 7.5   COLORU DARK YELLOW*   CLARITYU Clear   PROTEINU Negative   BLOODU MODERATE*   RBCUA 11-20*   WBCUA None seen   NITRU Negative   GLUCOSEU Negative   BILIRUBINUR Negative   UROBILINOGEN 1.0   KETUA 15*   . PFTs   None  Echo    Ordered  Cultures    Procalcitonin  No results found for: [facility]    Radiology       CT CHEST WO CONTRAST [[id number]] Collected: 05/07/22 2220     Order Status: Completed Updated: 05/07/22 2228     Narrative:       EXAMINATION:   CT OF THE CHEST WITHOUT CONTRAST 5/7/2022 9:54 am     TECHNIQUE:   CT of the chest was performed without the administration of intravenous   contrast. Multiplanar reformatted images are provided for review. Dose   modulation, iterative reconstruction, and/or weight based adjustment of the   mA/kV was utilized to reduce the radiation dose to as low as reasonably   achievable. COMPARISON:   None. HISTORY:   ORDERING SYSTEM PROVIDED HISTORY: pleural effusion   TECHNOLOGIST PROVIDED HISTORY:   Reason for exam:->pleural effusion   Reason for Exam: evaluate left pleural effusion     FINDINGS:   THYROID: The visualized thyroid gland is unremarkable. CARDIOVASCULAR: The heart is normal in size. There is no pericardial   effusion. The aorta is normal in course and caliber. The main pulmonary   artery is normal in caliber. LUNGS/PLEURA: There is a moderate left pleural effusion.  Right basilar   atelectasis. Olu Fail is no pneumothorax.  The trachea and central bronchi are   patent. THORACIC NODES: There are no pathologically enlarged mediastinal, hilar or   axillary lymph nodes. UPPER ABDOMEN: Limited images of the upper abdomen demonstrate no acute   abnormality. BONES/SOFT: There is no acute osseous or soft tissue abnormality.      Impression:       Moderate left pleural effusion.      CT Cervical Spine WO Contrast [[id number]] Collected: 05/06/22 2249     Order Status: Completed Updated: 05/06/22 2256     Narrative:       EXAMINATION:   CT OF THE CERVICAL SPINE WITHOUT CONTRAST 5/6/2022 3:27 pm     TECHNIQUE:   CT of the cervical spine was performed without the administration of   intravenous contrast. Multiplanar reformatted images are provided for review. Dose modulation, iterative reconstruction, and/or weight based adjustment of   the mA/kV was utilized to reduce the radiation dose to as low as reasonably   achievable. COMPARISON:   CTA of the head and neck dated March 11, 2021     HISTORY:   ORDERING SYSTEM PROVIDED HISTORY: neck pain after fall   TECHNOLOGIST PROVIDED HISTORY:   Reason for exam:->neck pain after fall   Decision Support Exception - unselect if not a suspected or confirmed   emergency medical condition->Emergency Medical Condition (MA)   Reason for Exam: neck pain   Relevant Medical/Surgical History: no surgery     FINDINGS:   BONES/ALIGNMENT: No evidence of an acute fracture or traumatic malalignment   is present.  Exaggerated lordosis is noted within the upper thoracic spine. Osteopenia is present.  Patient is status post prior transsphenoidal   pituitary surgery. DEGENERATIVE CHANGES: Mild degenerative changes again noted within the   cervical spine.  Facet arthropathy is present throughout the cervical region. SOFT TISSUES: There is no prevertebral soft tissue swelling.  Moderate size   left pleural effusion is partially visualized.      Impression:       1.  No evidence of an acute fracture or traumatic malalignment involving the   cervical spine   2. Moderate size left pleural effusion partially visualized.  Dedicated CT   imaging of the chest would be helpful for further evaluation.      CT HIP LEFT WO CONTRAST [[id number]] Collected: 05/06/22 2017     Order Status: Completed Updated: 05/06/22 2023     Narrative:       EXAMINATION:   CT OF THE LEFT HIP WITHOUT CONTRAST 5/6/2022 7:20 pm     TECHNIQUE:   CT of the left hip was performed without the administration of intravenous   contrast.  Multiplanar reformatted images are provided for review. Dose   modulation, iterative reconstruction, and/or weight based adjustment of the   mA/kV was utilized to reduce the radiation dose to as low as reasonably   achievable. COMPARISON:   Pelvis and left hip radiograph May 6, 2022     HISTORY   ORDERING SYSTEM PROVIDED HISTORY: fall, pain, unable to ambulate   TECHNOLOGIST PROVIDED HISTORY:   Reason for exam:->fall, pain, unable to ambulate   Decision Support Exception - unselect if not a suspected or confirmed   emergency medical condition->Emergency Medical Condition (MA)   Reason for Exam: fall onto pavement last night with left hip injury,   difficulty ambulating     FINDINGS:   Bones: Osseous mineralization is decreased.  Acute fracture of the left   sacral ala.  Acute fractures of the left superior and inferior pubic rami   with fracture line extending into the left pubic symphysis. Soft Tissue: Visualized intrapelvic structures demonstrate no acute   intrapelvic process.  Soft tissue and intramuscular edema adjacent to the   left pelvic fractures.  No organized fluid collection.  No subcutaneous gas. Joint: Anatomic alignment of the hips with mild degenerative change of the   hips.  Moderate to severe facet arthropathy of the imaged lower lumbar spine.      Impression:       1. Acute fracture of the left sacral ala.    2. Acute and mildly displaced fracture of the left superior pubic ramus   extending into the pubic symphysis. 3. Acute and mildly displaced fracture of the left inferior pubic ramus. 4. Osteopenia.      CT Head WO Contrast [[id number]] Collected: 05/06/22 1946     Order Status: Completed Updated: 05/06/22 1957     Narrative:       EXAMINATION:   CT OF THE HEAD WITHOUT CONTRAST  5/6/2022 7:20 pm     TECHNIQUE:   CT of the head was performed without the administration of intravenous   contrast. Dose modulation, iterative reconstruction, and/or weight based   adjustment of the mA/kV was utilized to reduce the radiation dose to as low   as reasonably achievable. COMPARISON:   CT head 03/11/2021. HISTORY:   ORDERING SYSTEM PROVIDED HISTORY: fall   TECHNOLOGIST PROVIDED HISTORY:   Reason for exam:->fall   Has a \"code stroke\" or \"stroke alert\" been called? ->No   Decision Support Exception - unselect if not a suspected or confirmed   emergency medical condition->Emergency Medical Condition (MA)   Reason for Exam: fall last night onto pavement, possible head injury     FINDINGS:   BRAIN/VENTRICLES: No acute hemorrhage given artifact in the posterior fossa. Periventricular and subcortical hypoattenuation is nonspecific and may be   related to microvascular disease. Hypoattenuation in the white matter of the   anterior temporal lobes again visualized. Artifact partially obscures the flor. Artifact partially obscures the cerebellum. [place] white differentiation appears maintained given artifact near the skull   base and through the posterior fossa. Partially empty sella.  Cerebral volume   loss and mild prominence of the ventricles again visualized.  There is no   midline shift. Basal cisterns appear patent. ORBITS: Visualized orbits appear unremarkable on this unenhanced exam.     SINUSES: Mucous retention cyst or polyp in the right maxillary sinus and   ethmoid sinus.  Visualized mastoid air cells appear clear.      SOFT TISSUES/SKULL: No depressed calvarial fracture.      Impression:     No acute hemorrhage given artifact in the posterior fossa.  No midline shift. Other findings as described.      XR HIP 2-3 Moise Mosley PELVIS LEFT [[id number]] Collected: 05/06/22 1847     Order Status: Completed Updated: 05/06/22 1854     Narrative:       EXAMINATION:   ONE XRAY VIEW OF THE PELVIS AND TWO XRAY VIEWS LEFT HIP     5/6/2022 5:48 pm     COMPARISON:   None. HISTORY:   ORDERING SYSTEM PROVIDED HISTORY: fall, pain   TECHNOLOGIST PROVIDED HISTORY:   Reason for exam:->fall, pain   Reason for Exam: fall     FINDINGS:   The hip demonstrates normal alignment. No evidence of acute fracture.  No   focal osseus lesion.   Pelvis is intact.      Impression:       No acute abnormality of the hip. RECOMMENDATION:   Given the degree of osteopenia, nondisplaced fractures may be   radiographically occult.  If pain or concern for fracture persists, consider   MR imaging.      XR CHEST PORTABLE [[id number]] Collected: 05/06/22 1833     Order Status: Completed Updated: 05/06/22 1838     Narrative:       EXAMINATION:   ONE XRAY VIEW OF THE CHEST     5/6/2022 5:48 pm     COMPARISON:   Chest x-ray dated 11 March 2021     HISTORY:   ORDERING SYSTEM PROVIDED HISTORY: fall, weakness   TECHNOLOGIST PROVIDED HISTORY:   Reason for exam:->fall, weakness   Reason for Exam: fall     FINDINGS:   Stable cardiomediastinal silhouette.  Hazy airspace disease in the left lower   lobe.  Blunting of the left costophrenic angle.  No pneumothorax.      Impression:       Hazy left lower lobe airspace disease and small left pleural effusion.              Assessment   · Left pleural effusion. · Fall  · Acute fracture of the left sacral ala. · Acute and mildly displaced fracture of the left superior pubic ramus. · Acute and mildly displaced fracture of the left inferior pubic ramus. · Hyponatremia  · Hyperlipidemia  · Osteopenia  Recommendations   Recommended thoracentesis. The patient declined.   She is asymptomatic and not interested in the procedure. Discussed with the patient and . The differential diagnosis could be as simple as transudative fluid but also it could be hemothorax or cancer. The fact that it is asymptomatic does not rule out serious disease. The  is convinced to proceed with the thoracentesis however the patient is hesitant. They will discuss among themselves. I explained the procedure and the risk and benefits. Will reevaluate tomorrow and proceed with thoracentesis if the patient is agreeable. Echo. Thank you for the consult and allowing us to participate in the care of your patient. Case discussed with nurse and patient/family. Questions and concerns addressed. Meds and Orders reviewed.     Electronically signed by Jayshree Jules MD on 5/8/2022

## 2022-05-08 NOTE — PROGRESS NOTES
Hospitalist Progress Note      PCP: Murali Johnston MD    Date of Admission: 5/6/2022    Chief Complaint: [facility] Course: H&P reviewed. Patient admitted with pelvic fractures s/p mechanical fall    Subjective:     Pt had brief use of 02 yesterday after getting pain med per RN. Pt sating well on RA at time of eval. Denies any SOB. Reports left hip pain with movement       Medications:  Reviewed    Infusion Medications    sodium chloride       Scheduled Medications    potassium chloride  40 mEq Oral Once    enoxaparin  30 mg SubCUTAneous Daily    sodium chloride flush  10 mL IntraVENous 2 times per day     PRN Meds: sodium chloride flush, sodium chloride, potassium chloride, magnesium sulfate, promethazine **OR** ondansetron, acetaminophen **OR** acetaminophen, oxyCODONE, oxyCODONE      Intake/Output Summary (Last 24 hours) at 5/8/2022 1122  Last data filed at 5/8/2022 0958  Gross per 24 hour   Intake 120 ml   Output 200 ml   Net -80 ml       Physical Exam Performed:    BP (!) 109/59   Pulse 87   Temp 98.4 °F (36.9 °C) (Oral)   Resp 16   Ht 5' 3\" (1.6 m)   Wt 102 lb 8.2 oz (46.5 kg)   LMP  (LMP Unknown)   SpO2 95%   BMI 18.16 kg/m²     General appearance: No apparent distress, appears stated age  HEENT: Pupils equal, round,Conjunctivae/corneas clear. Neck: Supple, with full range of motion. No jugular venous distention. Trachea midline. Respiratory:  Normal respiratory effort. Clear to auscultation, bilaterally without Rales/Wheezes/Rhonchi. Cardiovascular: Regular rate and rhythm with normal S1/S2 without murmurs, rubs or gallops. Abdomen: Soft, non-tender, non-distended with normal bowel sounds. Musculoskeletal: No clubbing, cyanosis or edema bilaterally.   Limited range of motion in her left hip due to pain  Skin: Warm and dry  Psychiatric: Alert , not anxious appearing  Neuro: Limited due to poor cooperation from the patient      Labs:   Recent Labs     05/06/22  1800 05/07/22  0558 05/08/22  0544   WBC 4.9 4.8 5.5   HGB 13.0 12.6 10.7*   HCT 39.7 37.2 31.6*    168 159     Recent Labs     05/06/22  1800 05/07/22  0558 05/08/22  0544   * 135* 134*   K 4.1 4.1 4.1   CL 95* 98* 101   CO2 22 23 22   BUN 10 6* 12   CREATININE 0.6 0.6 0.8   CALCIUM 9.8 8.7 9.6     Recent Labs     05/06/22  1800 05/07/22  0558 05/08/22  0544   AST 43* 30 14*   ALT 25 22 14   BILITOT 0.9 0.9 0.6   ALKPHOS 78 82 62     No results for input(s): INR in the last 72 hours. Recent Labs     05/06/22  1800 05/07/22  0558   CKTOTAL 404*  --    TROPONINI <0.01 <0.01       Urinalysis:      Lab Results   Component Value Date    NITRU Negative 05/06/2022    WBCUA None seen 05/06/2022    BACTERIA 1+ 12/17/2017    RBCUA 11-20 05/06/2022    BLOODU MODERATE 05/06/2022    SPECGRAV 1.015 05/06/2022    GLUCOSEU Negative 05/06/2022       Radiology:  CT CHEST WO CONTRAST   Final Result   Moderate left pleural effusion. CT Cervical Spine WO Contrast   Final Result   1. No evidence of an acute fracture or traumatic malalignment involving the   cervical spine   2. Moderate size left pleural effusion partially visualized. Dedicated CT   imaging of the chest would be helpful for further evaluation. CT HIP LEFT WO CONTRAST   Final Result   1. Acute fracture of the left sacral ala. 2. Acute and mildly displaced fracture of the left superior pubic ramus   extending into the pubic symphysis. 3. Acute and mildly displaced fracture of the left inferior pubic ramus. 4. Osteopenia. CT Head WO Contrast   Final Result   No acute hemorrhage given artifact in the posterior fossa. No midline shift. Other findings as described. XR HIP 2-3 VW W PELVIS LEFT   Final Result   No acute abnormality of the hip. RECOMMENDATION:   Given the degree of osteopenia, nondisplaced fractures may be   radiographically occult. If pain or concern for fracture persists, consider   MR imaging. XR CHEST PORTABLE   Final Result   Hazy left lower lobe airspace disease and small left pleural effusion. Assessment/Plan:    Active Hospital Problems    Diagnosis     Pubic ramus fracture, left, closed, initial encounter [facility]) [S36.560O]      Priority: Medium     Acute mildly displaced left superior pubic ramus extending into the pubic symphysis and acute mildly displaced fracture of the left inferior pubic rami. Ortho consulted- appreciate hep. Conservative management. Weight bearing as tolerated. PT/OT  rec SNF. Pt declined. Lives with her elderly . Discussed with her  and he is unable to take care of her at home given her debilitation from her fractures. He will discuss with his family and pt for possible SNF. SW to assist with dispo planning. Left pleural effusion: moderate on CT. Etiology unclear. Pt asymptomatic. Pro BNP mildly elevated but normal for age. Cardiac  echo to rule out cardiac cause pending. Will consult pulm- may need thoracentesis for eval.              Hyponatremia: mild, likely vol depletion. S/p IVF with improvement from admission. Hold off further IVF given pleural effusion      Malnutrition: dietician consulted      Hypotension : BP borderline low. ? Cause. Noticed drop in hgb to 10.7 today. Repeat H & H. Consider CT abd/pelvis to r/o hemorrhage if downtrending given pelvic fractures.        DVT Prophylaxis: lovenox   Diet: ADULT DIET; Regular  ADULT ORAL NUTRITION SUPPLEMENT; Breakfast, Lunch, Dinner; Clear Liquid Oral Supplement. Start diet as no surgery planned   Code Status: Full Code    PT/OT Eval Status: ordered, rec SNF. SW to assist     Dispo -  Possibly in 1-2 days pending dispo planning  and clinical course.  Admission status switched to inpatient per utilization reviewer kalyan Rivera MD

## 2022-05-08 NOTE — PROGRESS NOTES
RN to provider \"Pt's family brought in Restasis eyedrops. They have been sent to pharmacy. Could we have order to use home supply if appropriate? Thank you. \"    Yash Roth RN

## 2022-05-08 NOTE — CARE COORDINATION
CM spoke with patient over the phone for assessment. Patient hard to understand. Patient gave permission to speak with spouse. CM left message with spouse for return call.

## 2022-05-08 NOTE — PROGRESS NOTES
RN to attending physician \"Pt's spouse, Anjana Vizcaino, is requesting for the physician to give him a call. He cannot be at bedside at this time. Peter's #[phone]. Therapy has recommended SNF, pt stating she will refuse; however, she was unable to transfer at all with therapy yesterday. He understands she has not been discharged at this time. Thank you.  \"    Myriam Rivera RN

## 2022-05-09 PROBLEM — D64.9 NORMOCYTIC NORMOCHROMIC ANEMIA: Status: ACTIVE | Noted: 2022-05-09

## 2022-05-09 PROBLEM — J90 PLEURAL EFFUSION ON LEFT: Status: ACTIVE | Noted: 2022-05-09

## 2022-05-09 LAB
ANION GAP SERPL CALCULATED.3IONS-SCNC: 12 MMOL/L (ref 3–16)
BUN BLDV-MCNC: 13 MG/DL (ref 7–20)
CALCIUM SERPL-MCNC: 9.8 MG/DL (ref 8.3–10.6)
CHLORIDE BLD-SCNC: 101 MMOL/L (ref 99–110)
CO2: 22 MMOL/L (ref 21–32)
CREAT SERPL-MCNC: 0.7 MG/DL (ref 0.6–1.2)
GFR AFRICAN AMERICAN: >60
GFR NON-AFRICAN AMERICAN: >60
GLUCOSE BLD-MCNC: 110 MG/DL (ref 70–99)
HCT VFR BLD CALC: 31.6 % (ref 36–48)
HEMOGLOBIN: 10.7 G/DL (ref 12–16)
MCH RBC QN AUTO: 29 PG (ref 26–34)
MCHC RBC AUTO-ENTMCNC: 33.9 G/DL (ref 31–36)
MCV RBC AUTO: 85.7 FL (ref 80–100)
PDW BLD-RTO: 14.5 % (ref 12.4–15.4)
PLATELET # BLD: 204 K/UL (ref 135–450)
PMV BLD AUTO: 8 FL (ref 5–10.5)
POTASSIUM REFLEX MAGNESIUM: 3.7 MMOL/L (ref 3.5–5.1)
RBC # BLD: 3.69 M/UL (ref 4–5.2)
SODIUM BLD-SCNC: 135 MMOL/L (ref 136–145)
WBC # BLD: 4.4 K/UL (ref 4–11)

## 2022-05-09 PROCEDURE — 6370000000 HC RX 637 (ALT 250 FOR IP): Performed by: INTERNAL MEDICINE

## 2022-05-09 PROCEDURE — 36415 COLL VENOUS BLD VENIPUNCTURE: CPT

## 2022-05-09 PROCEDURE — 6360000002 HC RX W HCPCS: Performed by: INTERNAL MEDICINE

## 2022-05-09 PROCEDURE — 97112 NEUROMUSCULAR REEDUCATION: CPT

## 2022-05-09 PROCEDURE — 97535 SELF CARE MNGMENT TRAINING: CPT

## 2022-05-09 PROCEDURE — 85027 COMPLETE CBC AUTOMATED: CPT

## 2022-05-09 PROCEDURE — 1200000000 HC SEMI PRIVATE

## 2022-05-09 PROCEDURE — 97530 THERAPEUTIC ACTIVITIES: CPT

## 2022-05-09 PROCEDURE — 80048 BASIC METABOLIC PNL TOTAL CA: CPT

## 2022-05-09 PROCEDURE — 2580000003 HC RX 258: Performed by: INTERNAL MEDICINE

## 2022-05-09 PROCEDURE — 99232 SBSQ HOSP IP/OBS MODERATE 35: CPT | Performed by: INTERNAL MEDICINE

## 2022-05-09 PROCEDURE — 97110 THERAPEUTIC EXERCISES: CPT

## 2022-05-09 RX ADMIN — SODIUM CHLORIDE, PRESERVATIVE FREE 10 ML: 5 INJECTION INTRAVENOUS at 21:43

## 2022-05-09 RX ADMIN — SODIUM CHLORIDE, PRESERVATIVE FREE 10 ML: 5 INJECTION INTRAVENOUS at 08:27

## 2022-05-09 RX ADMIN — ENOXAPARIN SODIUM 30 MG: 100 INJECTION SUBCUTANEOUS at 08:26

## 2022-05-09 RX ADMIN — ACETAMINOPHEN 650 MG: 325 TABLET ORAL at 21:40

## 2022-05-09 ASSESSMENT — PAIN DESCRIPTION - DESCRIPTORS: DESCRIPTORS: ACHING

## 2022-05-09 ASSESSMENT — PAIN SCALES - GENERAL: PAINLEVEL_OUTOF10: 6

## 2022-05-09 ASSESSMENT — PAIN DESCRIPTION - LOCATION: LOCATION: PELVIS;HIP

## 2022-05-09 ASSESSMENT — PAIN DESCRIPTION - ORIENTATION: ORIENTATION: LEFT

## 2022-05-09 NOTE — PROGRESS NOTES
Physical Therapy  Facility/Department: [facility] C5 - MED SURG/ORTHO  Physical Therapy Daily Treatment Note    Name: Brian Jensen  : 1945  MRN: [id number]  Date of Service: 2022    Discharge Recommendations:  Subacute/Skilled Nursing Facility   PT Equipment Recommendations  Equipment Needed: No  Other: defer to facility      Patient Diagnosis(es): The encounter diagnosis was Multiple closed fractures of pelvis with stable disruption of pelvic ring, initial encounter ([facility] [address] Past Medical History:  has a past medical history of Hyperlipidemia, Influenza A, Kidney stones, and Vision impairment. Past Surgical History:  has a past surgical history that includes eye surgery; pituitary surgery; Cystoscopy; Breast surgery; Appendectomy; and Cataract removal (left eye    2010). Assessment  Pt progressing well towards goals. Pt was able to tolerate standing x2 for 2-4 minutes each w/ max cues for motivation. Pt req grossly ModAx2 for OOB mobility and MaxAx2 for bed mobility d/t pain in LLE. Pt will cont to benefit from skilled PT services to improve function towards goals. pt will benefit from SNF at d/c.   Specific Instructions for Next Treatment: progress mobility as tolerated  Activity Tolerance  Activity Tolerance: Patient limited by fatigue;Patient limited by pain;Treatment limited secondary to decreased cognition;Patient limited by endurance     Plan   Plan  Plan: 3-5 times per week  Plan weeks: 1 week 22  Specific Instructions for Next Treatment: progress mobility as tolerated  Current Treatment Recommendations: Strengthening,Balance training,Functional mobility training,Transfer training,Endurance training,Gait training,Safety education & training,Patient/Caregiver education & training,Equipment evaluation, education, & procurement,Therapeutic activities  Safety Devices  Type of Devices: Bed alarm in place,All bing prominences offloaded,Heels elevated for pressure relief,Left in bed,Nurse notified,Gait belt     Restrictions  Restrictions/Precautions  Restrictions/Precautions: Fall Risk,General Precautions  Lower Extremity Weight Bearing Restrictions  Left Lower Extremity Weight Bearing: Weight Bearing As Tolerated  Position Activity Restriction  Other position/activity restrictions: Up with assist. WBAT     Subjective   Pain: Pt reports pain in R hip         Social/Functional History  Social/Functional History  Lives With: Spouse ( available 24/7 however pt reporting he would not be able to physically assist)  Type of Home: House  Home Layout: Two level,1/2 bath on main level  Home Access: Stairs to enter with rails  Entrance Stairs - Number of Steps: 17 NABIL  Entrance Stairs - Rails: Both  Bathroom Shower/Tub: Tub/Shower unit  Bathroom Toilet: Standard  Home Equipment: Cane,Walker, rolling (does not use AD)  Has the patient had two or more falls in the past year or any fall with injury in the past year?: Yes (\"at least 1 fall every one and a half months)  Receives Help From: Family (daughter assists with IADLs PRN)  ADL Assistance: Independent  Homemaking Assistance: Independent  Homemaking Responsibilities: No  Ambulation Assistance: Independent  Transfer Assistance: Independent  Active : Yes  Mode of Transportation: Car  Occupation: Retired  Leisure & Hobbies: Travel  Vision/Hearing       Cognition   Orientation  Orientation Level: Oriented X4  Cognition  Arousal/Alertness: Appropriate responses to stimuli  Following Commands: Follows multistep commands with repitition; Follows multistep commands with increased time  Attention Span: Attends with cues to redirect     Objective   Pulse: 83 (Simultaneous filing. User may not have seen previous data.)  Heart Rate Source: Monitor  BP: 117/72 (Simultaneous filing. User may not have seen previous data.)  BP Location: Right upper arm (Simultaneous filing.  User may not have seen previous data.)  BP Method: Automatic  Patient Position: Semi etelvina  MAP (Calculated): 87 (Simultaneous filing. User may not have seen previous data.)  Resp: 18  SpO2: 94 % (Simultaneous filing. User may not have seen previous data.)  O2 Device: None (Room air) (Simultaneous filing. User may not have seen previous data.)                          Bed Mobility Training  Bed Mobility Training: Yes (Simultaneous filing. User may not have seen previous data.)  Overall Level of Assistance: Assist X2  Interventions: Demonstration;Verbal cues (Simultaneous filing. User may not have seen previous data.)  Supine to Sit: Assist X2;Moderate assistance  Sit to Supine: Assist X2;Moderate assistance (Simultaneous filing. User may not have seen previous data.)  Scooting: Assist X2;Maximum assistance (Simultaneous filing. User may not have seen previous data.)  Balance  Sitting: Impaired  Sitting - Static: Poor (constant support)  Sitting - Dynamic: Poor (constant support)  Standing: Impaired  Standing - Static: Constant support  Standing - Dynamic: Constant support  Transfer Training  Transfer Training: Yes (Simultaneous filing. User may not have seen previous data.)  Overall Level of Assistance: Moderate assistance;Assist X2  Sit to Stand: Moderate assistance;Assist X2  Stand to Sit: Moderate assistance;Assist X2                 Exercise Treatment: 1 x 10 bilateral AROM ankle pumps, bilat long arc quads 2x10    AM-PAC Score  AM-PAC Inpatient Mobility Raw Score : 9 (05/09/22 1601)  AM-PAC Inpatient T-Scale Score : 30.55 (05/09/22 1601)  Mobility Inpatient CMS 0-100% Score: 81.38 (05/09/22 1601)  Mobility Inpatient CMS G-Code Modifier : CM (05/09/22 1601)          Goals  Short Term Goals  Time Frame for Short term goals: 1 week 5/14/22  Short term goal 1: Supine <> sit with moderate assistance. Short term goal 2: Sit <> stand with mod assist and least restrictive assistive device. Short term goal 3: Bed <> chair with mod assist and LRAD.   Short term goal 4: Ambulate 10 feet with mod assist and LRAD. Short term goal 5: By 22 patient will tolerate 12-15 reps of her exercises to maximize her strength and endurance. Patient Goals   Patient goals : To go home. to decrease her pain       Therapy Time   Individual Concurrent Group Co-treatment   Time In 1449         Time Out 1535         Minutes 46         Timed Code Treatment Minutes: [address], [place]    If pt is unable to be seen after this session, please let this note serve as discharge summary. Please see case management note for discharge disposition. Thank you.

## 2022-05-09 NOTE — CARE COORDINATION
7/4/93 We have precert for pt to d/c to [facility] when medically ready, no CM barriers.

## 2022-05-09 NOTE — PROGRESS NOTES
Physician Progress Note      PATIENT:               Herve Tariq  CSN #:                  [id number]  :                       1945  ADMIT DATE:       2022 5:24 PM  DISCH DATE:  RESPONDING  PROVIDER #:        Pastor Andre MD          QUERY TEXT:    Pt admitted with fall/pelvic fx. Pt noted to have osteopenia per imaging and   need for osteoporosis screening per ortho consult. If possible, please   document in progress notes and discharge summary if you are evaluating and/or   treating any of the following: The medical record reflects the following:  Risk Factors: Frail 67 yr old female, BMI 17, osteopenia, fall while losing   balance walking in driveway    Clinical Indicators: per ortho consult- \"Osteopenia/osteoporosis evaluation   and treatment\"  Per CT- \"1. Acute fracture of the left sacral ala. ...4. Osteopenia. \"    Treatment: Ortho c/s, imaging, reccomended outpt osteoporosis screening and   mgmt. thank you,  Antoni Landa RN, [email or web address]  Options provided:  -- Possible osteoporotic pelvic fracture following fall which would not   usually break a normal, healthy bone  -- Traumatic pelvic fracture  -- Other - I will add my own diagnosis  -- Disagree - Not applicable / Not valid  -- Disagree - Clinically unable to determine / Unknown  -- Refer to Clinical Documentation Reviewer    PROVIDER RESPONSE TEXT:    This patient has a traumatic pelvic fracture. Query created by: Edison Hightower on 2022 11:01 AM      QUERY TEXT:    Pt admitted with pelvic fracture and has malnutrition documented. Please   further specify type of malnutrition with documentation in the medical record. The medical record reflects the following:  Risk Factors: 67 yr old, osteopenia per imaging, [address] descent, BMI 17    Clinical Indicators: per H&P and attending PN's- \"malnutrition\", Per RD   consult- \"Nutrition Assessment:  Consulted for unintentional weight loss.  Pt nutritionally compromised AEB   decreased appetite. Pt currently NPO, asking for food. Pt states eats 3 meals   daily at home, but small portions. During visit pt reports lactose   intolerance, RD added to allergy list. No weight hx. Pt reports UBW of 104 lb,   but at one time she was 130 lb. ALEX when weight loss occured. Declined   lactose free ONS. Will continue to monitor. ... At risk for malnutrition\"    Treatment: RD consult, Regular diet, pt refusal of suggested lactose free ONS,   ongoing supportive care    ASPEN Criteria:    https://[email or web address] onlinelibrary. luz. com/doi/full/10.1177/882778577517255  5    thank you,  Antoni Landa RN, BSN  [email or web address]  Options provided:  -- Mild Malnutrition  -- Moderate Malnutrition  -- Severe Malnutrition  -- Other - I will add my own diagnosis  -- Disagree - Not applicable / Not valid  -- Disagree - Clinically unable to determine / Unknown  -- Refer to Clinical Documentation Reviewer    PROVIDER RESPONSE TEXT:    This patient has mild malnutrition.     Query created by: Edison Hightower on 5/9/2022 11:07 AM      Electronically signed by:  Pastor Andre MD 5/9/2022 1:53 PM

## 2022-05-09 NOTE — PROGRESS NOTES
Hospitalist Progress Note      PCP: Consuelo Watson MD    Date of Admission: 5/6/2022    Chief Complaint: [facility] Course: H&P reviewed. Patient admitted with pelvic fractures s/p mechanical fall    Subjective: Pt seen and examined with  at bedside. Reports left hip pain with movement . Offers no new complaints. Speaks Occitan and  able to interpret. Discussed with  regarding pulmonology recs - Patient refusing Left Thoracentesis and Ok with Non invasive Testing Echo at this time . Noted PT OT recommendations for SNF. Discussed with social work -assisting with management. Medications:  Reviewed    Infusion Medications    sodium chloride       Scheduled Medications    cycloSPORINE  1 drop Both Eyes BID    potassium chloride  40 mEq Oral Once    enoxaparin  30 mg SubCUTAneous Daily    sodium chloride flush  10 mL IntraVENous 2 times per day     PRN Meds: traMADol, sodium chloride flush, sodium chloride, potassium chloride, magnesium sulfate, promethazine **OR** ondansetron, acetaminophen **OR** acetaminophen, oxyCODONE, oxyCODONE      Intake/Output Summary (Last 24 hours) at 5/9/2022 1353  Last data filed at 5/9/2022 1339  Gross per 24 hour   Intake 110 ml   Output 750 ml   Net -640 ml       Physical Exam Performed:  /77   Pulse 88   Temp 98.6 °F (37 °C) (Oral)   Resp 18   Ht 5' 3\" (1.6 m)   Wt 97 lb 3.6 oz (44.1 kg)   LMP  (LMP Unknown)   SpO2 94%   BMI 17.22 kg/m²     General appearance: No apparent distress, appears stated age  HEENT: Pupils equal, round,Conjunctivae/corneas clear. Neck: Supple, with full range of motion. No jugular venous distention. Trachea midline. Respiratory:  Normal respiratory effort. Clear to auscultation, bilaterally without Rales/Wheezes/Rhonchi. Cardiovascular: Regular rate and rhythm with normal S1/S2 without murmurs, rubs or gallops.   Abdomen: Soft, non-tender, non-distended with normal bowel sounds. Musculoskeletal: No clubbing, cyanosis or edema bilaterally. Limited range of motion in her left hip due to pain  Skin: Warm and dry  Psychiatric: Alert , not anxious appearing  Neuro: Limited due to poor cooperation from the patient      Labs:   Recent Labs     05/07/22  0558 05/07/22  0558 05/08/22  0544 05/08/22  1431 05/09/22  0945   WBC 4.8  --  5.5  --  4.4   HGB 12.6   < > 10.7* 10.7* 10.7*   HCT 37.2   < > 31.6* 31.8* 31.6*     --  159  --  204    < > = values in this interval not displayed. Recent Labs     05/07/22  0558 05/08/22  0544 05/09/22  0945   * 134* 135*   K 4.1 4.1 3.7   CL 98* 101 101   CO2 23 22 22   BUN 6* 12 13   CREATININE 0.6 0.8 0.7   CALCIUM 8.7 9.6 9.8     Recent Labs     05/06/22  1800 05/07/22  0558 05/08/22  0544   AST 43* 30 14*   ALT 25 22 14   BILITOT 0.9 0.9 0.6   ALKPHOS 78 82 62     No results for input(s): INR in the last 72 hours. Recent Labs     05/06/22  1800 05/07/22  0558   CKTOTAL 404*  --    TROPONINI <0.01 <0.01       Urinalysis:    Lab Results   Component Value Date    NITRU Negative 05/06/2022    WBCUA None seen 05/06/2022    BACTERIA 1+ 12/17/2017    RBCUA 11-20 05/06/2022    BLOODU MODERATE 05/06/2022    SPECGRAV 1.015 05/06/2022    GLUCOSEU Negative 05/06/2022       Radiology:  CT CHEST WO CONTRAST   Final Result   Moderate left pleural effusion. CT Cervical Spine WO Contrast   Final Result   1. No evidence of an acute fracture or traumatic malalignment involving the   cervical spine   2. Moderate size left pleural effusion partially visualized. Dedicated CT   imaging of the chest would be helpful for further evaluation. CT HIP LEFT WO CONTRAST   Final Result   1. Acute fracture of the left sacral ala. 2. Acute and mildly displaced fracture of the left superior pubic ramus   extending into the pubic symphysis. 3. Acute and mildly displaced fracture of the left inferior pubic ramus. 4. Osteopenia.          CT Head WO Contrast   Final Result   No acute hemorrhage given artifact in the posterior fossa. No midline shift. Other findings as described. XR HIP 2-3 VW W PELVIS LEFT   Final Result   No acute abnormality of the hip. RECOMMENDATION:   Given the degree of osteopenia, nondisplaced fractures may be   radiographically occult. If pain or concern for fracture persists, consider   MR imaging. XR CHEST PORTABLE   Final Result   Hazy left lower lobe airspace disease and small left pleural effusion. Assessment/Plan:    Active Hospital Problems    Diagnosis     Pleural effusion on left [J90]      Priority: Medium    Normocytic normochromic anemia [D64.9]      Priority: Medium    Multiple closed fractures of pelvis (HCC) [S32.82XA]      Priority: Medium    Pubic ramus fracture, left, closed, initial encounter ([facility] [address] [S32.592A]      Priority: Medium     Acute mildly displaced left superior pubic ramus extending into the pubic symphysis and acute mildly displaced fracture of the left inferior pubic rami. Ortho consulted- appreciate hep. Conservative management. Weight bearing as tolerated. PT/OT  rec SNF. Pt declined. Lives with her elderly . Discussed with her  and he is unable to take care of her at home given her debilitation from her fractures. He will discuss with his family and pt for possible SNF. SW to assist with dispo planning. Left pleural effusion: moderate on CT. Etiology unclear. Pt asymptomatic. Pro BNP mildly elevated but normal for age. Cardiac  echo to rule out cardiac cause pending. Pulmonology consulted-discussed need for left-sided thoracentesis but patient refusing at this time. Hyponatremia: mild, likely vol depletion. S/p IVF with improvement from admission. Held  further IVF given pleural effusion    Mild Protein Calorie Malnutrition: dietician consulted and following      Hypotension : BP borderline low on admission . Improved since .   Hb stable at 10.7 today.       DVT Prophylaxis: lovenox   Diet: ADULT DIET; Regular  ADULT ORAL NUTRITION SUPPLEMENT; Breakfast, Lunch, Dinner; Clear Liquid Oral Supplement. Start diet as no surgery planned   Code Status: Full Code    PT/OT Eval Status: ordered, rec SNF. SW to assist     Dispo -  Possibly in 1-2 days pending pre-Cert , dispo planning  and clinical course.      Barb Polk MD

## 2022-05-09 NOTE — PROGRESS NOTES
INPATIENT PULMONARY CRITICAL CARE PROGRESS NOTE      Reason for visit    Pleural effusion     SUBJECTIVE: Patient when seen this morning was lying in the bed without any significant shortness of breath, no increasing cough or expectoration, patient does not have any chest pain or palpitations, patient was not having any abdominal symptoms of concern, patient does not have any hypoxemia and was on room air oxygen with saturation of 94%, patient was afebrile and hemodynamically maintained, patient was having sinus rhythm on the monitor, patient has had adequate urine output, patient's p.o. intake as documented in epic was on the lower side, patient has per documentation has a cumulative fluid balance of -1.2 L, patient does not have any other pertinent review of system of concern             Physical Exam:  Blood pressure 131/77, pulse 88, temperature 98.6 °F (37 °C), temperature source Oral, resp. rate 18, height 5' 3\" (1.6 m), weight 97 lb 3.6 oz (44.1 kg), SpO2 94 %, not currently breastfeeding.'   Constitutional:  No acute distress. HENT:  Oropharynx is clear and moist. No thyromegaly. Eyes:  Conjunctivae are normal. Pupils equal, round, and reactive to light. No scleral icterus. Neck: . No tracheal deviation present. No obvious thyroid mass. Cardiovascular: Normal rate, regular rhythm, normal heart sounds. No right ventricular heave. No lower extremity edema. Pulmonary/Chest: No wheezes. No rales. Chest wall is not dull to percussion. No accessory muscle usage or stridor. Decreased breath sound intensity on the left side  Abdominal: Soft. Bowel sounds present. No distension or hernia. No tenderness. Musculoskeletal: No cyanosis. No clubbing. No obvious joint deformity. Lymphadenopathy: No cervical or supraclavicular adenopathy. Skin: Skin is warm and dry. No rash or nodules on the exposed extremities. Psychiatric: Normal mood and affect. Behavior is normal.  No anxiety.    Neurologic: Alert, awake and oriented. PERRL. Speech fluent        Results:  CBC:   Recent Labs     05/06/22  1800 05/06/22  1800 05/07/22  0558 05/08/22  0544 05/08/22  1431   WBC 4.9  --  4.8 5.5  --    HGB 13.0   < > 12.6 10.7* 10.7*   HCT 39.7   < > 37.2 31.6* 31.8*   MCV 89.5  --  87.5 86.8  --      --  168 159  --     < > = values in this interval not displayed. BMP:   Recent Labs     05/06/22  1800 05/07/22  0558 05/08/22  0544   * 135* 134*   K 4.1 4.1 4.1   CL 95* 98* 101   CO2 22 23 22   BUN 10 6* 12   CREATININE 0.6 0.6 0.8     LIVER PROFILE:   Recent Labs     05/06/22  1800 05/07/22  0558 05/08/22  0544   AST 43* 30 14*   ALT 25 22 14   BILITOT 0.9 0.9 0.6   ALKPHOS 78 82 62     UA:  Recent Labs     05/06/22  1858   COLORU DARK YELLOW*   PHUR 7.5   WBCUA None seen   RBCUA 11-20*   CLARITYU Clear   SPECGRAV 1.015   LEUKOCYTESUR Negative   UROBILINOGEN 1.0   BILIRUBINUR Negative   BLOODU MODERATE*   GLUCOSEU Negative       Imaging:  I have reviewed radiology images personally. CT CHEST WO CONTRAST   Final Result   Moderate left pleural effusion. CT Cervical Spine WO Contrast   Final Result   1. No evidence of an acute fracture or traumatic malalignment involving the   cervical spine   2. Moderate size left pleural effusion partially visualized. Dedicated CT   imaging of the chest would be helpful for further evaluation. CT HIP LEFT WO CONTRAST   Final Result   1. Acute fracture of the left sacral ala. 2. Acute and mildly displaced fracture of the left superior pubic ramus   extending into the pubic symphysis. 3. Acute and mildly displaced fracture of the left inferior pubic ramus. 4. Osteopenia. CT Head WO Contrast   Final Result   No acute hemorrhage given artifact in the posterior fossa. No midline shift. Other findings as described. XR HIP 2-3 VW W PELVIS LEFT   Final Result   No acute abnormality of the hip.       RECOMMENDATION:   Given the degree of osteopenia, nondisplaced fractures may be   radiographically occult. If pain or concern for fracture persists, consider   MR imaging. XR CHEST PORTABLE   Final Result   Hazy left lower lobe airspace disease and small left pleural effusion. CT Head WO Contrast    Result Date: 5/6/2022  EXAMINATION: CT OF THE HEAD WITHOUT CONTRAST  5/6/2022 7:20 pm TECHNIQUE: CT of the head was performed without the administration of intravenous contrast. Dose modulation, iterative reconstruction, and/or weight based adjustment of the mA/kV was utilized to reduce the radiation dose to as low as reasonably achievable. COMPARISON: CT head 03/11/2021. HISTORY: ORDERING SYSTEM PROVIDED HISTORY: fall TECHNOLOGIST PROVIDED HISTORY: Reason for exam:->fall Has a \"code stroke\" or \"stroke alert\" been called? ->No Decision Support Exception - unselect if not a suspected or confirmed emergency medical condition->Emergency Medical Condition (MA) Reason for Exam: fall last night onto pavement, possible head injury FINDINGS: BRAIN/VENTRICLES: No acute hemorrhage given artifact in the posterior fossa. Periventricular and subcortical hypoattenuation is nonspecific and may be related to microvascular disease. Hypoattenuation in the white matter of the anterior temporal lobes again visualized. Artifact partially obscures the flor. Artifact partially obscures the cerebellum. Gleen Gave white differentiation appears maintained given artifact near the skull base and through the posterior fossa. Partially empty sella. Cerebral volume loss and mild prominence of the ventricles again visualized. There is no midline shift. Basal cisterns appear patent. ORBITS: Visualized orbits appear unremarkable on this unenhanced exam. SINUSES: Mucous retention cyst or polyp in the right maxillary sinus and ethmoid sinus. Visualized mastoid air cells appear clear. SOFT TISSUES/SKULL: No depressed calvarial fracture.      No acute hemorrhage given artifact in the posterior fossa. No midline shift. Other findings as described. CT CHEST WO CONTRAST    Result Date: 5/7/2022  EXAMINATION: CT OF THE CHEST WITHOUT CONTRAST 5/7/2022 9:54 am TECHNIQUE: CT of the chest was performed without the administration of intravenous contrast. Multiplanar reformatted images are provided for review. Dose modulation, iterative reconstruction, and/or weight based adjustment of the mA/kV was utilized to reduce the radiation dose to as low as reasonably achievable. COMPARISON: None. HISTORY: ORDERING SYSTEM PROVIDED HISTORY: pleural effusion TECHNOLOGIST PROVIDED HISTORY: Reason for exam:->pleural effusion Reason for Exam: evaluate left pleural effusion FINDINGS: THYROID: The visualized thyroid gland is unremarkable. CARDIOVASCULAR: The heart is normal in size. There is no pericardial effusion. The aorta is normal in course and caliber. The main pulmonary artery is normal in caliber. LUNGS/PLEURA: There is a moderate left pleural effusion. Right basilar atelectasis. There is no pneumothorax. The trachea and central bronchi are patent. THORACIC NODES: There are no pathologically enlarged mediastinal, hilar or axillary lymph nodes. UPPER ABDOMEN: Limited images of the upper abdomen demonstrate no acute abnormality. BONES/SOFT: There is no acute osseous or soft tissue abnormality. Moderate left pleural effusion. CT Cervical Spine WO Contrast    Result Date: 5/6/2022  EXAMINATION: CT OF THE CERVICAL SPINE WITHOUT CONTRAST 5/6/2022 3:27 pm TECHNIQUE: CT of the cervical spine was performed without the administration of intravenous contrast. Multiplanar reformatted images are provided for review. Dose modulation, iterative reconstruction, and/or weight based adjustment of the mA/kV was utilized to reduce the radiation dose to as low as reasonably achievable.  COMPARISON: CTA of the head and neck dated March 11, 2021 HISTORY: ORDERING SYSTEM PROVIDED HISTORY: neck pain after fall TECHNOLOGIST PROVIDED HISTORY: Reason for exam:->neck pain after fall Decision Support Exception - unselect if not a suspected or confirmed emergency medical condition->Emergency Medical Condition (MA) Reason for Exam: neck pain Relevant Medical/Surgical History: no surgery FINDINGS: BONES/ALIGNMENT: No evidence of an acute fracture or traumatic malalignment is present. Exaggerated lordosis is noted within the upper thoracic spine. Osteopenia is present. Patient is status post prior transsphenoidal pituitary surgery. DEGENERATIVE CHANGES: Mild degenerative changes again noted within the cervical spine. Facet arthropathy is present throughout the cervical region. SOFT TISSUES: There is no prevertebral soft tissue swelling. Moderate size left pleural effusion is partially visualized. 1. No evidence of an acute fracture or traumatic malalignment involving the cervical spine 2. Moderate size left pleural effusion partially visualized. Dedicated CT imaging of the chest would be helpful for further evaluation. XR CHEST PORTABLE    Result Date: 5/6/2022  EXAMINATION: ONE XRAY VIEW OF THE CHEST 5/6/2022 5:48 pm COMPARISON: Chest x-ray dated 11 March 2021 HISTORY: ORDERING SYSTEM PROVIDED HISTORY: fall, weakness TECHNOLOGIST PROVIDED HISTORY: Reason for exam:->fall, weakness Reason for Exam: fall FINDINGS: Stable cardiomediastinal silhouette. Hazy airspace disease in the left lower lobe. Blunting of the left costophrenic angle. No pneumothorax. Hazy left lower lobe airspace disease and small left pleural effusion. CT HIP LEFT WO CONTRAST    Result Date: 5/6/2022  EXAMINATION: CT OF THE LEFT HIP WITHOUT CONTRAST 5/6/2022 7:20 pm TECHNIQUE: CT of the left hip was performed without the administration of intravenous contrast.  Multiplanar reformatted images are provided for review.  Dose modulation, iterative reconstruction, and/or weight based adjustment of the mA/kV was utilized to reduce the radiation dose to as low as reasonably achievable. COMPARISON: Pelvis and left hip radiograph May 6, 2022 HISTORY ORDERING SYSTEM PROVIDED HISTORY: fall, pain, unable to ambulate TECHNOLOGIST PROVIDED HISTORY: Reason for exam:->fall, pain, unable to ambulate Decision Support Exception - unselect if not a suspected or confirmed emergency medical condition->Emergency Medical Condition (MA) Reason for Exam: fall onto pavement last night with left hip injury, difficulty ambulating FINDINGS: Bones: Osseous mineralization is decreased. Acute fracture of the left sacral ala. Acute fractures of the left superior and inferior pubic rami with fracture line extending into the left pubic symphysis. Soft Tissue: Visualized intrapelvic structures demonstrate no acute intrapelvic process. Soft tissue and intramuscular edema adjacent to the left pelvic fractures. No organized fluid collection. No subcutaneous gas. Joint: Anatomic alignment of the hips with mild degenerative change of the hips. Moderate to severe facet arthropathy of the imaged lower lumbar spine. 1. Acute fracture of the left sacral ala. 2. Acute and mildly displaced fracture of the left superior pubic ramus extending into the pubic symphysis. 3. Acute and mildly displaced fracture of the left inferior pubic ramus. 4. Osteopenia. XR HIP 2-3 VW W PELVIS LEFT    Result Date: 5/6/2022  EXAMINATION: ONE XRAY VIEW OF THE PELVIS AND TWO XRAY VIEWS LEFT HIP 5/6/2022 5:48 pm COMPARISON: None. HISTORY: ORDERING SYSTEM PROVIDED HISTORY: fall, pain TECHNOLOGIST PROVIDED HISTORY: Reason for exam:->fall, pain Reason for Exam: fall FINDINGS: The hip demonstrates normal alignment. No evidence of acute fracture. No focal osseus lesion. Pelvis is intact. No acute abnormality of the hip. RECOMMENDATION: Given the degree of osteopenia, nondisplaced fractures may be radiographically occult. If pain or concern for fracture persists, consider MR imaging.      Results for Lukas Ramirez (MRN [id number]) as of 5/9/2022 09:46   Ref. Range 5/6/2022 18:00 5/7/2022 05:58 5/8/2022 05:44 5/8/2022 14:31   WBC Latest Ref Range: 4.0 - 11.0 K/uL 4.9 4.8 5.5    RBC Latest Ref Range: 4.00 - 5.20 M/uL 4.44 4.25 3.64 (L)    Hemoglobin Quant Latest Ref Range: 12.0 - 16.0 g/dL 13.0 12.6 10.7 (L) 10.7 (L)   Hematocrit Latest Ref Range: 36.0 - 48.0 % 39.7 37.2 31.6 (L) 31.8 (L)   MCV Latest Ref Range: 80.0 - 100.0 fL 89.5 87.5 86.8    MCH Latest Ref Range: 26.0 - 34.0 pg 29.2 29.7 29.3    MCHC Latest Ref Range: 31.0 - 36.0 g/dL 32.6 33.9 33.7    MPV Latest Ref Range: 5.0 - 10.5 fL 7.0 7.3 7.7    RDW Latest Ref Range: 12.4 - 15.4 % 14.5 14.2 14.6    Platelet Count Latest Ref Range: 135 - 450 K/uL 171 168 159    Neutrophils % Latest Units: % 76.1 77.8 76.4    Lymphocyte % Latest Units: % 17.0 14.8 13.7    Monocytes % Latest Units: % 2.2 2.5 3.2    Eosinophils % Latest Units: % 3.9 4.1 5.9    Basophils % Latest Units: % 0.8 0.8 0.8            Assessment:  Principal Problem:    Pubic ramus fracture, left, closed, initial encounter ([facility])  Active Problems:    Multiple closed fractures of pelvis (HCC)    Pleural effusion on left    Normocytic normochromic anemia  Resolved Problems:    * No resolved hospital problems.  *          Plan:   · Oxygen supplementation, if required, to keep saturation between 90 and 94% only  · Patient was on room air oxygen when seen  · Patient was not having any significant shortness of breath or increased work of breathing and patient was comfortably lying in the bed  · Patient was offered thoracentesis by Dr. Eulene Inks over the weekend but patient refused  · Does not appear to have any increased chest congestion at this time and does not have any hypoxemia  · Patient has left-sided pelvic fractures and there is a small possibility that patient may have hemothorax  · Patient hemoglobin hematocrit is slightly dropping as compared to admission history of followed  · Patient does not need any antibiotics or steroids or bronchodilators from pulm standpoint review  · Monitor input output and BMP  · Correct electrolytes and whenever necessary basis  · Monitor H&H closely  · PT/OT  · PUD and DVT prophylaxis as per IM    No other recommendation from pulmonary/critical care standpoint review- will sign off    ? Discharge planning     Case discussed with case management      Electronically signed by:  Katerine Nguyễn MD    5/9/2022    9:48 AM.

## 2022-05-09 NOTE — CARE COORDINATION
5/9/22 Call placed to [facility] located within [facility] to inquire about precert, no answer, left vm, waiting for a return phone call.

## 2022-05-09 NOTE — PROGRESS NOTES
Occupational Therapy  Facility/Department: [facility] C5 - MED SURG/ORTHO  Daily Treatment Note  NAME: Jarod Newell  : 1945  MRN: [id number]    Date of Service: 2022    Discharge Recommendations:  [address]          Patient Diagnosis(es): The encounter diagnosis was Multiple closed fractures of pelvis with stable disruption of pelvic ring, initial encounter ([facility] [address] Assessment    Assessment: Pt tolerated OT session well with extra time and frequent rest breaks to complete tasks. Pt progressing with activity tolerance and able to tolerate standing for the first time today during therapy. At baseline pt is independent with I/ADLs. Pt currently requiring Max Ax2 to Mod A x2 for bed mobility. Pt able to complete sit to stand from EOB x2 reps with Mod A x2 using RW with moderate cues for body positioning and safety. Pt requiring Min A for seated UB ADLs and Total A with LB ADLs. Pt with posterior lean in standing. Pt able to tolerate weightshift in stance today, but unable to advance BLE for stepping. Pt remains far from baseline level of occupational performance. Recommend continued OT services to increase safety and independence with ADLs and functional transfers. Barriers to return home include 17 steps to enter home, requiring assistance of two trained therapists for transfers and standing, unable to ambulate, requiring 2 person assitance with LB ADLs. Anticipate pt will require continued OT services in SNF setting upon discharge. Activity Tolerance: Patient limited by fatigue;Patient limited by pain  Discharge Recommendations: [address]  Times per Week: 3-5x  Current Treatment Recommendations: Strengthening;Balance training;Functional mobility training; Endurance training; Wheelchair mobility training;Pain management; Safety education & training;Patient/Caregiver education & training;Positioning;Equipment evaluation, education, & procurement; Modalities; Self-Care / ADL; Home management training;Gait training     Restrictions  Restrictions/Precautions  Restrictions/Precautions: Fall Risk,General Precautions  Lower Extremity Weight Bearing Restrictions  Left Lower Extremity Weight Bearing: Weight Bearing As Tolerated  Position Activity Restriction  Other position/activity restrictions: Up with assist. WBAT    Subjective   Subjective  Subjective: Pt supine in bed upon therapy arrival. Pt agreeable to therapy session with encouragement. Pain: Pt reports 7/10 pain L pubic area and hip  Pain: Pt reports pain in R hip  Cognition  Overall Cognitive Status: WFL  Arousal/Alertness: Appropriate responses to stimuli  Following Commands: Follows multistep commands with repitition; Follows multistep commands with increased time  Attention Span: Attends with cues to redirect  Memory: Decreased recall of precautions  Safety Judgement: Decreased awareness of need for safety  Problem Solving: Decreased awareness of errors  Insights: Fully aware of deficits  Initiation: Does not require cues  Sequencing: Does not require cues        Objective    Vitals  Vitals  BP:117/72  HR: 84bpm  SpO2 remains >90% on RA  Heart Rate Source: Monitor  BP Method: Automatic  Patient Position: Semi fowlers     Bed Mobility Training  Bed Mobility Training: Yes   Overall Level of Assistance: Assist X2  Interventions: Demonstration;Verbal cues   Supine to Sit: Assist X2;Moderate assistance  Sit to Supine: Assist X2;Maximum assistance   Scooting: Assist X2;Maximum assistance     Balance  Sitting: Impaired  Sitting - Static: Poor (constant support)  Sitting - Dynamic: Poor (constant support)  Standing: Impaired  Standing - Static: Constant support  Standing - Dynamic: Constant support    Transfer Training  Transfer Training: Yes   Overall Level of Assistance: Moderate assistance;Assist X2  Sit to Stand:  Moderate assistance;Assist X2  Stand to Sit: Moderate assistance;Assist X2 ADL  Grooming: Minimal assistance;Verbal cueing;Setup; Increased time to complete  Grooming Skilled Clinical Factors: Min A for sitting balance EOB. Min A to brush teeth with assistance to manage water cup and spit basin  LE Dressing: Dependent/Total  Toileting: Dependent/Total        Safety Devices  Type of Devices: Bed alarm in place; All bing prominences offloaded; Heels elevated for pressure relief;Left in bed;Nurse notified;Gait belt;Call light within reach     Patient Education  Education Given To: Patient; Family  Education Provided: Plan of Care;Role of Therapy;Precautions;Transfer Training;Equipment; Family Education  Education Provided Comments: home safety concerns, OT discharge recommendations, bed mobility, importance of OOB activity, hand placement for transfers, standing/sitting balance, WBAT BLE  Education Method: Verbal;Demonstration  Barriers to Learning: None  Education Outcome: Verbalized understanding;Continued education needed    Patient Safety: pt left supine in bed, bed alarm activated, call light in reach,  present, RN aware, ice applied to L hip, all needs met    Goals  Short Term Goals  Time Frame for Short term goals: 1 week (5/14/22)  Short Term Goal 1: Pt will perform functional transfers with mod Ax2 and use of LRAD  Short Term Goal 2: Pt will tolerate sitting at EOB with SBA for static sitting balance for >5 mins  Short Term Goal 3: Pt will perform x15 reps of BUE exercises in preparation for use of LRAD  Patient Goals   Patient goals : \"No pain\"       Therapy Time   Individual Concurrent Group Co-treatment   Time In 3328         Time Out 1534         Minutes 45         Timed Code Treatment Minutes: 39 Minutes    If pt is discharged prior to next OT session, this note will serve as the discharge summary.       Jerod Dai, OT

## 2022-05-10 VITALS
RESPIRATION RATE: 18 BRPM | SYSTOLIC BLOOD PRESSURE: 105 MMHG | WEIGHT: 98.33 LBS | HEART RATE: 73 BPM | TEMPERATURE: 98.2 F | OXYGEN SATURATION: 95 % | DIASTOLIC BLOOD PRESSURE: 68 MMHG | BODY MASS INDEX: 17.42 KG/M2 | HEIGHT: 63 IN

## 2022-05-10 LAB
ANION GAP SERPL CALCULATED.3IONS-SCNC: 12 MMOL/L (ref 3–16)
BUN BLDV-MCNC: 16 MG/DL (ref 7–20)
CALCIUM SERPL-MCNC: 9.3 MG/DL (ref 8.3–10.6)
CHLORIDE BLD-SCNC: 101 MMOL/L (ref 99–110)
CO2: 24 MMOL/L (ref 21–32)
CREAT SERPL-MCNC: 0.6 MG/DL (ref 0.6–1.2)
GFR AFRICAN AMERICAN: >60
GFR NON-AFRICAN AMERICAN: >60
GLUCOSE BLD-MCNC: 101 MG/DL (ref 70–99)
HCT VFR BLD CALC: 35 % (ref 36–48)
HEMOGLOBIN: 11.5 G/DL (ref 12–16)
LV EF: 55 %
LVEF MODALITY: NORMAL
MAGNESIUM: 2 MG/DL (ref 1.8–2.4)
MCH RBC QN AUTO: 29.1 PG (ref 26–34)
MCHC RBC AUTO-ENTMCNC: 32.9 G/DL (ref 31–36)
MCV RBC AUTO: 88.5 FL (ref 80–100)
PDW BLD-RTO: 14.6 % (ref 12.4–15.4)
PLATELET # BLD: 252 K/UL (ref 135–450)
PMV BLD AUTO: 7.2 FL (ref 5–10.5)
POTASSIUM REFLEX MAGNESIUM: 3.5 MMOL/L (ref 3.5–5.1)
RBC # BLD: 3.95 M/UL (ref 4–5.2)
SARS-COV-2, NAAT: NOT DETECTED
SODIUM BLD-SCNC: 137 MMOL/L (ref 136–145)
WBC # BLD: 3.6 K/UL (ref 4–11)

## 2022-05-10 PROCEDURE — 80048 BASIC METABOLIC PNL TOTAL CA: CPT

## 2022-05-10 PROCEDURE — 87635 SARS-COV-2 COVID-19 AMP PRB: CPT

## 2022-05-10 PROCEDURE — 97110 THERAPEUTIC EXERCISES: CPT

## 2022-05-10 PROCEDURE — 93306 TTE W/DOPPLER COMPLETE: CPT

## 2022-05-10 PROCEDURE — 97535 SELF CARE MNGMENT TRAINING: CPT

## 2022-05-10 PROCEDURE — 85027 COMPLETE CBC AUTOMATED: CPT

## 2022-05-10 PROCEDURE — 83735 ASSAY OF MAGNESIUM: CPT

## 2022-05-10 PROCEDURE — 6370000000 HC RX 637 (ALT 250 FOR IP): Performed by: INTERNAL MEDICINE

## 2022-05-10 PROCEDURE — 6360000002 HC RX W HCPCS: Performed by: INTERNAL MEDICINE

## 2022-05-10 PROCEDURE — 97530 THERAPEUTIC ACTIVITIES: CPT

## 2022-05-10 PROCEDURE — 2580000003 HC RX 258: Performed by: INTERNAL MEDICINE

## 2022-05-10 PROCEDURE — 36415 COLL VENOUS BLD VENIPUNCTURE: CPT

## 2022-05-10 PROCEDURE — 97116 GAIT TRAINING THERAPY: CPT

## 2022-05-10 RX ORDER — TRAMADOL HYDROCHLORIDE 50 MG/1
50 TABLET ORAL EVERY 8 HOURS PRN
Qty: 10 TABLET | Refills: 0 | Status: SHIPPED | OUTPATIENT
Start: 2022-05-10 | End: 2022-05-13

## 2022-05-10 RX ADMIN — SODIUM CHLORIDE, PRESERVATIVE FREE 10 ML: 5 INJECTION INTRAVENOUS at 08:39

## 2022-05-10 RX ADMIN — ENOXAPARIN SODIUM 30 MG: 100 INJECTION SUBCUTANEOUS at 08:39

## 2022-05-10 RX ADMIN — CYCLOSPORINE 1 DROP: 0.5 EMULSION OPHTHALMIC at 08:41

## 2022-05-10 RX ADMIN — OXYCODONE 5 MG: 5 TABLET ORAL at 10:44

## 2022-05-10 RX ADMIN — ACETAMINOPHEN 650 MG: 325 TABLET ORAL at 08:39

## 2022-05-10 ASSESSMENT — PAIN SCALES - GENERAL
PAINLEVEL_OUTOF10: 7
PAINLEVEL_OUTOF10: 5

## 2022-05-10 NOTE — PROGRESS NOTES
Pt D/C's to SNF per MD order. Printed AVS, JD, and placed in D/C folder. D/C'd IV site and tele monitor per MD order. Tele monitor updated. Pt has all belongings packed. Transportation scheduled to pick-up pt at 1430 per SW. Rapid Covid-19 test negative from 5/10.

## 2022-05-10 NOTE — PLAN OF CARE
Problem: Discharge Planning  Goal: Discharge to home or other facility with appropriate resources  Outcome: Adequate for Discharge     Problem: Skin/Tissue Integrity  Goal: Absence of new skin breakdown  Description: 1. Monitor for areas of redness and/or skin breakdown  2. Assess vascular access sites hourly  3. Every 4-6 hours minimum:  Change oxygen saturation probe site  4. Every 4-6 hours:  If on nasal continuous positive airway pressure, respiratory therapy assess nares and determine need for appliance change or resting period.   5/10/2022 1346 by Ezequiel Rascon RN  Outcome: Adequate for Discharge  5/10/2022 1208 by Ezequiel Rascon RN  Outcome: Progressing     Problem: Safety - Adult  Goal: Free from fall injury  5/10/2022 1346 by Ezequiel Rascon RN  Outcome: Adequate for Discharge  5/10/2022 1208 by Ezequiel Rascon RN  Outcome: Progressing     Problem: Nutrition Deficit:  Goal: Optimize nutritional status  5/10/2022 1346 by Ezequiel Rascon RN  Outcome: Adequate for Discharge  5/10/2022 1208 by Ezequiel Rascon RN  Outcome: Progressing     Problem: Pain  Goal: Verbalizes/displays adequate comfort level or baseline comfort level  5/10/2022 1346 by Ezequiel Rascon RN  Outcome: Adequate for Discharge  5/10/2022 1208 by Ezequiel Rascon RN  Outcome: Progressing     Problem: ABCDS Injury Assessment  Goal: Absence of physical injury  Outcome: Adequate for Discharge

## 2022-05-10 NOTE — PROGRESS NOTES
Occupational Therapy  Facility/Department: [facility] C5 - MED SURG/ORTHO  Daily Treatment Note  NAME: Ben Weller  : 1945  MRN: [id number]    Date of Service: 5/10/2022    Discharge Recommendations:  Subacute/Skilled Nursing Facility        AM-PAC Daily Activity Inpatient   How much help for putting on and taking off regular lower body clothing?: Total  How much help for Bathing?: Total  How much help for Toileting?: Total  How much help for putting on and taking off regular upper body clothing?: A Little  How much help for taking care of personal grooming?: A Little  How much help for eating meals?: A Little  AM-PAC Inpatient Daily Activity Raw Score: 12  AM-PAC Inpatient ADL T-Scale Score : 30.6  ADL Inpatient CMS 0-100% Score: 66.57  ADL Inpatient CMS G-Code Modifier : CL      Patient Diagnosis(es): The encounter diagnosis was Multiple closed fractures of pelvis with stable disruption of pelvic ring, initial encounter ([facility] [address] Assessment    Assessment: Pt with fair tolerance of OT treatment, pt requires increased time for all tasks. Pt requires Ax2 for functional mobility, bed mobility and transfers. Pt tolerated bed to chair and additional static stand from chair with use of RW. Pt total A for toileting with purewick. Pt functioning below her baseline and would benefit from continued skilled OT in SNF setting at d/c. Activity Tolerance: Patient limited by pain  Discharge Recommendations: [address]  Times per Week: 3-5x     Restrictions  Restrictions/Precautions  Restrictions/Precautions: Fall Risk,General Precautions  Lower Extremity Weight Bearing Restrictions  Left Lower Extremity Weight Bearing: Weight Bearing As Tolerated  Position Activity Restriction  Other position/activity restrictions: Up with assist. WBAT    Subjective   Subjective  Subjective: Pt resting in bed, pleasant and agreeable to OT treatment.     Pain: Pt reports 5/10 pain in L pubic area.    Cognition  Following Commands: Follows one step commands with increased time; Follows one step commands with repetition  Safety Judgement: Decreased awareness of need for safety        Objective    Vitals  Vitals  Pulse: 74  BP: 118/70  BP Location: Right upper arm  BP Method: Automatic  Patient Position: Semi fowlers  MAP (Calculated): 86  SpO2: 94 %  O2 Device: None (Room air)    Bed Mobility Training  Bed Mobility Training: Yes  Interventions: Demonstration;Verbal cues  Supine to Sit: Moderate assistance;Assist X2 (to R with HOB elevated, use of bedrails and increased time)  Scooting: Moderate assistance (sitting scoot to edge of bed)    Balance  Sitting: Intact  Sitting - Static: Good (unsupported)  Sitting - Dynamic: Fair (occasional)  Standing: Impaired  Standing - Static: Constant support;Poor  Standing - Dynamic: Constant support;Poor    Transfer Training  Transfer Training: Yes  Overall Level of Assistance: Moderate assistance;Assist X2  Sit to Stand: Moderate assistance;Assist X2 (RW)  Stand to Sit: Moderate assistance;Assist X2 (RW)  Bed to Chair: Moderate assistance;Assist X2     ADL  Grooming: Setup;Stand by assistance;Verbal cueing; Increased time to complete (to wash face and brush teeth seated)  LE Dressing: Dependent/Total  Toileting: Dependent/Total (purewick)     Safety Devices  Type of Devices: Call light within reach;Nurse notified; Left in chair;Chair alarm in place;Gait belt     Patient Education  Education Given To: Patient  Education Provided: Role of Therapy;Plan of Care;ADL Adaptive Strategies;Transfer Training  Education Method: Demonstration;Verbal  Barriers to Learning: None  Education Outcome: Verbalized understanding;Demonstrated understanding    Goals  Short Term Goals  Time Frame for Short term goals: 1 week (5/14/22)  Short Term Goal 1: Pt will perform functional transfers with mod Ax2 and use of LRAD-- GOAL MET, pt demos mod A of 2 5/10/22  Short Term Goal 2: Pt will tolerate sitting at EOB with SBA for static sitting balance for >5 mins  Short Term Goal 3: Pt will perform x15 reps of BUE exercises in preparation for use of LRAD  Patient Goals   Patient goals :  \"No pain\"       Therapy Time   Individual Concurrent Group Co-treatment   Time In 0800         Time Out 0838         Minutes [id number] [facility], XENA/L

## 2022-05-10 NOTE — CARE COORDINATION
CASE MANAGEMENT DISCHARGE SUMMARY      Discharge to: [facility] completed: Yes [facility] Exemption Notification (HENS) completed: Yes     IMM given: 5/9/22    New Durable Medical Equipment ordered/agency:     Transportation:    Agency used: [address] [place] [address] up time: 2:30 pm    Ambulance form completed: Yes    Confirmed discharge plan with: Pt, Pt's  Kellie Romero, facility and RN      Facility/Agency, name:  [facility] faxed   Phone number for report to facility: [address] [place] [address] [phone]       Note: Discharging nurse to complete JD, reconcile AVS, and place final copy with patient's discharge packet. RN to ensure that written prescriptions for  Level II medications are sent with patient to the facility as per protocol.

## 2022-05-10 NOTE — PLAN OF CARE
Problem: Skin/Tissue Integrity  Goal: Absence of new skin breakdown  Description: 1. Monitor for areas of redness and/or skin breakdown  2. Assess vascular access sites hourly  3. Every 4-6 hours minimum:  Change oxygen saturation probe site  4. Every 4-6 hours:  If on nasal continuous positive airway pressure, respiratory therapy assess nares and determine need for appliance change or resting period.   Outcome: Progressing     Problem: Safety - Adult  Goal: Free from fall injury  Outcome: Progressing     Problem: Nutrition Deficit:  Goal: Optimize nutritional status  Outcome: Progressing     Problem: Pain  Goal: Verbalizes/displays adequate comfort level or baseline comfort level  Outcome: Progressing

## 2022-05-10 NOTE — DISCHARGE SUMMARY
Hospital Medicine Discharge Summary    Patient ID: Antonio Weems      Patient's PCP: Quincy Silva MD    Admit Date: 5/6/2022     Discharge Date:  5/10/2022    Admitting Provider: Imelda Kelley DO     Discharge Provider: Gabino Noonan MD     Discharge Diagnoses: Active Hospital Problems    Diagnosis     Pleural effusion on left [J90]      Priority: Medium    Normocytic normochromic anemia [D64.9]      Priority: Medium    Multiple closed fractures of pelvis (HCC) [S32.82XA]      Priority: Medium    Pubic ramus fracture, left, closed, initial encounter [facility]) [S32.592A]      Priority: Medium       The patient was seen and examined on day of discharge and this discharge summary is in conjunction with any daily progress note from day of discharge. Hospital Course: 48-year-old CF admitted to [facility] with pelvic fractures s/p  mechanical fall at home    By problem list  Acute mildly displaced left superior pubic ramus extending into the pubic symphysis and acute mildly displaced fracture of the left inferior pubic rami. Ortho consulted- Recommended  Conservative management. Weight bearing as tolerated. PT/OT  rec SNF. Pt declined. Lives with her elderly . Discussed with her  and he is unable to take care of her at home given her debilitation from her fractures. He further  discussed with his family and pt agreeable to  SNF. SW assisted with disposition      Left pleural effusion: moderate on CT. Etiology unclear. Pt asymptomatic. Pro BNP mildly elevated but normal for age. Cardiac  echo  ruled out cardiac cause . Normal LVEF   Pulmonology consulted-discussed need for left-sided thoracentesis but patient refusing at this time. Signed off      Hyponatremia: mild, likely vol depletion. S/p IVF with improvement from admission.  Held  further IVF given pleural effusion     Mild Protein Calorie Malnutrition: dietician consulted and following      Hypotension : BP borderline low on admission . Improved since . Hb stable at 10.7 today. Physical Exam Performed:   /70   Pulse 74   Temp 97.5 °F (36.4 °C) (Oral)   Resp 18   Ht 5' 3\" (1.6 m)   Wt 98 lb 5.2 oz (44.6 kg)   LMP  (LMP Unknown)   SpO2 94%   BMI 17.42 kg/m²     General appearance: No apparent distress, appears stated age  HEENT: Pupils equal, round,Conjunctivae/corneas clear. Neck: Supple, with full range of motion. No jugular venous distention. Trachea midline. Respiratory:  Normal respiratory effort. Clear to auscultation, bilaterally without Rales/Wheezes/Rhonchi. Cardiovascular: Regular rate and rhythm with normal S1/S2 without murmurs, rubs or gallops. Abdomen: Soft, non-tender, non-distended with normal bowel sounds. Musculoskeletal: No clubbing, cyanosis or edema bilaterally. Limited range of motion in her left hip due to pain  Skin: Warm and dry  Psychiatric: Alert , not anxious appearing  Neuro: Limited due to poor cooperation from the patient      Labs: For convenience and continuity at follow-up the following most recent labs are provided:      CBC:    Lab Results   Component Value Date    WBC 3.6 05/10/2022    HGB 11.5 05/10/2022    HCT 35.0 05/10/2022     05/10/2022       Renal:    Lab Results   Component Value Date     05/10/2022    K 3.5 05/10/2022     05/10/2022    CO2 24 05/10/2022    BUN 16 05/10/2022    CREATININE 0.6 05/10/2022    CALCIUM 9.3 05/10/2022    PHOS 1.9 12/19/2017     Significant Diagnostic Studies    Radiology:   CT CHEST WO CONTRAST   Final Result   Moderate left pleural effusion. CT Cervical Spine WO Contrast   Final Result   1. No evidence of an acute fracture or traumatic malalignment involving the   cervical spine   2. Moderate size left pleural effusion partially visualized. Dedicated CT   imaging of the chest would be helpful for further evaluation. CT HIP LEFT WO CONTRAST   Final Result   1. Acute fracture of the left sacral ala.    2. Acute and mildly displaced fracture of the left superior pubic ramus   extending into the pubic symphysis. 3. Acute and mildly displaced fracture of the left inferior pubic ramus. 4. Osteopenia. CT Head WO Contrast   Final Result   No acute hemorrhage given artifact in the posterior fossa. No midline shift. Other findings as described. XR HIP 2-3 VW W PELVIS LEFT   Final Result   No acute abnormality of the hip. RECOMMENDATION:   Given the degree of osteopenia, nondisplaced fractures may be   radiographically occult. If pain or concern for fracture persists, consider   MR imaging. XR CHEST PORTABLE   Final Result   Hazy left lower lobe airspace disease and small left pleural effusion. Consults:   IP CONSULT TO ORTHOPEDIC SURGERY  IP CONSULT TO HOSPITALIST  IP CONSULT TO DIETITIAN  IP CONSULT TO CASE MANAGEMENT  IP CONSULT TO PULMONOLOGY  IP CONSULT TO CASE MANAGEMENT    Disposition: SNF ([facility]     Condition at Discharge: Stable    Discharge Instructions/Follow-up: SNF to arrange follow-up with PCP and Ortho as instructed    Code Status:  Full Code    Activity: activity as tolerated    Diet: regular diet      Discharge Medications:   Current Discharge Medication List           Details   traMADol (ULTRAM) 50 MG tablet Take 1 tablet by mouth every 8 hours as needed for Pain for up to 3 days. Qty: 10 tablet, Refills: 0    Comments: Reduce doses taken as pain becomes manageable  Associated Diagnoses: Multiple closed fractures of pelvis with stable disruption of pelvic ring, initial encounter ([facility] [address]              Details   vitamin B-12 (CYANOCOBALAMIN) 100 MCG tablet Take 50 mcg by mouth daily      cycloSPORINE (RESTASIS) 0.05 % ophthalmic emulsion 1 drop.       Cholecalciferol (VITAMIN D3) 5000 units CAPS Take by mouth             Time Spent on discharge is more than 30 minutes in the examination, evaluation, counseling and review of medications and discharge plan.      Signed:    Keagan Thomas MD   5/10/2022      Thank you Angelica Bennett MD for the opportunity to be involved in this patient's care. If you have any questions or concerns, please feel free to contact me at [phone].

## 2022-05-10 NOTE — PROGRESS NOTES
Physical Therapy  Facility/Department: [facility] C5 - MED SURG/ORTHO  Daily Treatment Note  NAME: Raheel Montes De Oca  : 1945  MRN: [id number]    Date of Service: 5/10/2022    Discharge Recommendations:  Subacute/Skilled Nursing Facility   PT Equipment Recommendations  Equipment Needed: No  Other: defer to facility  [facility] 6 Clicks Inpatient Mobility:  AM-PAC Mobility Inpatient   How much difficulty turning over in bed?: A Lot  How much difficulty sitting down on / standing up from a chair with arms?: A Lot  How much difficulty moving from lying on back to sitting on side of bed?: A Lot  How much help from another person moving to and from a bed to a chair?: A Lot  How much help from another person needed to walk in hospital room?: A Lot  How much help from another person for climbing 3-5 steps with a railing?: Total  AM-PAC Inpatient Mobility Raw Score : 11  AM-PAC Inpatient T-Scale Score : 33.86  Mobility Inpatient CMS 0-100% Score: 72.57  Mobility Inpatient CMS G-Code Modifier : CL    Patient Diagnosis(es): The encounter diagnosis was Multiple closed fractures of pelvis with stable disruption of pelvic ring, initial encounter ([facility] [address] Assessment   Assessment: Pt was able to tolerate PT treatment, pt requires increased time for all tasks. Pt requires Ax2 for functional mobility, bed mobility and transfers. Pt tolerated bed to chair and additional static stand from chair with use of RW. Pt will cont to benefit from skilled PT services to improve function towards goals. pt will benefit from SNF at d/c. Co-tx collaboration this date with OT staff to safely progress pt toward goals. Pt will have better performance outcomes within a co-treatment than 1:1 session.     Activity Tolerance: Patient limited by pain  Equipment Needed: No  Other: defer to facility     Plan    Plan  Plan: 3-5 times per week  Plan weeks: 1 week 22  Specific Instructions for Next Treatment: progress mobility as tolerated  Current Treatment Recommendations: Strengthening;Balance training;Functional mobility training;Transfer training; Endurance training;Gait training; Safety education & training;Patient/Caregiver education & training;Equipment evaluation, education, & procurement; Therapeutic activities     Restrictions  Restrictions/Precautions  Restrictions/Precautions: Fall Risk,General Precautions  Lower Extremity Weight Bearing Restrictions  Left Lower Extremity Weight Bearing: Weight Bearing As Tolerated  Position Activity Restriction  Other position/activity restrictions: Up with assist. WBAT     Subjective    Subjective  Subjective: Pt agreeable to PT session  Pain: Pt reports pain in R hip, not rated  Orientation  Overall Orientation Status: Within Functional Limits  Cognition  Following Commands: Follows one step commands with increased time; Follows one step commands with repetition  Safety Judgement: Decreased awareness of need for safety     Objective   Vitals  Pulse: 74  BP: 118/70  BP Location: Right upper arm  MAP (Calculated): 86  SpO2: 94 %  O2 Device: None (Room air)  Bed Mobility Training  Bed Mobility Training: Yes  Interventions: Demonstration;Verbal cues  Supine to Sit: Moderate assistance;Assist X2  Scooting: Moderate assistance (sitting scoot to edge of bed)  Balance  Sitting: Intact  Sitting - Static: Good (unsupported)  Sitting - Dynamic: Fair (occasional)  Standing: Impaired  Standing - Static: Constant support;Poor  Standing - Dynamic: Constant support;Poor  Transfer Training  Transfer Training: Yes  Overall Level of Assistance: Moderate assistance;Assist X2  Sit to Stand:  Moderate assistance;Assist X2 (rw)  Stand to Sit: Moderate assistance;Assist X2  Bed to Chair: Moderate assistance;Assist X2  Gait Training  Gait Training: Yes  Right Side Weight Bearing: Full  Left Side Weight Bearing: As tolerated  Gait  Overall Level of Assistance: Assist X2;Moderate assistance  Interventions: Manual cues;Demonstration;Verbal cues  Base of Support: Shift to right  Speed/Iraida: Delayed  Step Length: Left shortened;Right shortened  Stance: Left decreased  Gait Abnormalities: Antalgic;Decreased step clearance  Distance (ft): 3 Feet (1')  Assistive Device: Walker, rolling     PT Exercises  Exercise Treatment: performed BLE TE 10X EACH: AP, SAQ, QS, GS, LAQ     Safety Devices  Type of Devices: Call light within reach;Nurse notified; Left in chair;Chair alarm in place;Gait belt     Patient Education  Education Given To: Patient; Family  Education Provided: Role of Therapy;Plan of Care;Home Exercise Program;Precautions;Transfer Training; Fall Prevention Strategies  Education Provided Comments: Pt educated on role of therapy for acute setting vs rehab setting and benefits of WB through LLE for improved healing. Education Method: Verbal  Barriers to Learning: Cognition  Education Outcome: Unable to demonstrate understanding;Continued education needed    Goals  Short Term Goals  Time Frame for Short term goals: 1 week 5/14/22  Short term goal 1: Supine <> sit with moderate assistance. -5/10 mod 2  Short term goal 2: Sit <> stand with mod assist and least restrictive assistive device. -5/10 mod 2  Short term goal 3: Bed <> chair with mod assist and LRAD.   -5/10 mod 2 rw  Short term goal 4: Ambulate 10 feet with mod assist and LRAD.   -5/10 Mod 2 3'  Short term goal 5: By 5/11/22 patient will tolerate 12-15 reps of her exercises to maximize her strength and endurance. -5/10 on-going  Patient Goals   Patient goals : To go home. to decrease her pain      Therapy Time   Individual Concurrent Group Co-treatment   Time In 0800         Time Out 0838         Minutes 38         Timed Code Treatment Minutes: 45 Minutes     If pt is unable to be seen after this session, please let this note serve as discharge summary. Please see case management note for discharge disposition. Thank you.     Belen Urrutia

## 2022-06-05 NOTE — ED PROVIDER NOTES
further evaluation, however she continued to refuse. Patient left AMA. For further details of [facility] Floor emergency department encounter, please see MOON Amado's documentation.         Emir Jeronimo MD  03/15/21 5321 NEPHROLOGY    Patient seen and examined.    MEDICATIONS  (STANDING):  aspirin enteric coated 81 milliGRAM(s) Oral daily  atorvastatin 80 milliGRAM(s) Oral at bedtime  BACItracin   Ointment 1 Application(s) Topical two times a day  cholecalciferol 1000 Unit(s) Oral daily  dextrose 5%. 1000 milliLiter(s) (50 mL/Hr) IV Continuous <Continuous>  dextrose 5%. 1000 milliLiter(s) (100 mL/Hr) IV Continuous <Continuous>  dextrose 50% Injectable 25 Gram(s) IV Push once  dextrose 50% Injectable 12.5 Gram(s) IV Push once  dextrose 50% Injectable 25 Gram(s) IV Push once  escitalopram 10 milliGRAM(s) Oral daily  gabapentin 300 milliGRAM(s) Oral two times a day  glucagon  Injectable 1 milliGRAM(s) IntraMuscular once  heparin   Injectable 5000 Unit(s) SubCutaneous every 12 hours  insulin glargine Injectable (LANTUS) 20 Unit(s) SubCutaneous <User Schedule>  insulin lispro (ADMELOG) corrective regimen sliding scale   SubCutaneous three times a day before meals  insulin lispro (ADMELOG) corrective regimen sliding scale   SubCutaneous at bedtime  pantoprazole    Tablet 40 milliGRAM(s) Oral before breakfast  sodium chloride 0.9%. 1000 milliLiter(s) (75 mL/Hr) IV Continuous <Continuous>  sodium chloride 0.9%. 1000 milliLiter(s) (100 mL/Hr) IV Continuous <Continuous>  ticagrelor 90 milliGRAM(s) Oral every 12 hours    VITALS:  T(C): , Max: 36.7 (06-04-22 @ 21:40)  T(F): , Max: 98.1 (06-04-22 @ 21:40)  HR: 77 (06-05-22 @ 06:12)  BP: 153/61 (06-05-22 @ 06:12)  RR: 17 (06-05-22 @ 06:12)  SpO2: 100% (06-05-22 @ 06:12)    PHYSICAL EXAM:  Constitutional: Frail; thin to cachectic  HEENT: DMM  Neck: Supple, No JVD  Respiratory: CTA-b/l  Cardiovascular: distant s1s2, (+)2/6 TODD  Gastrointestinal: BS+, soft, NT/ND  Extremities: No peripheral edema b/l  Neurological: no focal deficits; strength grossly intact  Back: no CVAT b/l  Skin: No rashes, no nevi    LABS:                        12.4   7.78  )-----------( 200      ( 05 Jun 2022 03:15 )             38.2     06-05    136  |  106  |  22  ----------------------------<  116<H>  4.7   |  18<L>  |  1.53<H>    Ca    8.6      05 Jun 2022 03:15  Phos  2.7     06-05  Mg     2.10     06-05     NEPHROLOGY    Patient seen and examined sitting on bed, reports feeling better, denies pain, no sob, in no acute distress.      MEDICATIONS  (STANDING):  aspirin enteric coated 81 milliGRAM(s) Oral daily  atorvastatin 80 milliGRAM(s) Oral at bedtime  BACItracin   Ointment 1 Application(s) Topical two times a day  cholecalciferol 1000 Unit(s) Oral daily  dextrose 5%. 1000 milliLiter(s) (50 mL/Hr) IV Continuous <Continuous>  dextrose 5%. 1000 milliLiter(s) (100 mL/Hr) IV Continuous <Continuous>  dextrose 50% Injectable 25 Gram(s) IV Push once  dextrose 50% Injectable 12.5 Gram(s) IV Push once  dextrose 50% Injectable 25 Gram(s) IV Push once  escitalopram 10 milliGRAM(s) Oral daily  gabapentin 300 milliGRAM(s) Oral two times a day  glucagon  Injectable 1 milliGRAM(s) IntraMuscular once  heparin   Injectable 5000 Unit(s) SubCutaneous every 12 hours  insulin glargine Injectable (LANTUS) 20 Unit(s) SubCutaneous <User Schedule>  insulin lispro (ADMELOG) corrective regimen sliding scale   SubCutaneous three times a day before meals  insulin lispro (ADMELOG) corrective regimen sliding scale   SubCutaneous at bedtime  pantoprazole    Tablet 40 milliGRAM(s) Oral before breakfast  sodium chloride 0.9%. 1000 milliLiter(s) (75 mL/Hr) IV Continuous <Continuous>  sodium chloride 0.9%. 1000 milliLiter(s) (100 mL/Hr) IV Continuous <Continuous>  ticagrelor 90 milliGRAM(s) Oral every 12 hours    VITALS:  T(C): , Max: 36.7 (06-04-22 @ 21:40)  T(F): , Max: 98.1 (06-04-22 @ 21:40)  HR: 77 (06-05-22 @ 06:12)  BP: 153/61 (06-05-22 @ 06:12)  RR: 17 (06-05-22 @ 06:12)  SpO2: 100% (06-05-22 @ 06:12)    PHYSICAL EXAM:  Constitutional: Frail; thin to cachectic  HEENT: DMM  Neck: Supple, No JVD  Respiratory: CTA-b/l  Cardiovascular: distant s1s2, (+)2/6 TODD  Gastrointestinal: BS+, soft, NT/ND  Extremities: No peripheral edema b/l  Neurological: no focal deficits; strength grossly intact  Back: no CVAT b/l  Skin: No rashes, no nevi    LABS:                        12.4   7.78  )-----------( 200      ( 05 Jun 2022 03:15 )             38.2     06-05    136  |  106  |  22  ----------------------------<  116<H>  4.7   |  18<L>  |  1.53<H>    Ca    8.6      05 Jun 2022 03:15  Phos  2.7     06-05  Mg     2.10     06-05

## 2024-05-06 ENCOUNTER — APPOINTMENT (OUTPATIENT)
Dept: GENERAL RADIOLOGY | Age: 79
End: 2024-05-06
Payer: MEDICARE

## 2024-05-06 ENCOUNTER — HOSPITAL ENCOUNTER (INPATIENT)
Age: 79
LOS: 4 days | Discharge: NURSING, REHAB OR LONG-TERM CARE | End: 2024-05-10
Attending: INTERNAL MEDICINE | Admitting: STUDENT IN AN ORGANIZED HEALTH CARE EDUCATION/TRAINING PROGRAM
Payer: MEDICARE

## 2024-05-06 ENCOUNTER — APPOINTMENT (OUTPATIENT)
Dept: CT IMAGING | Age: 79
End: 2024-05-06
Payer: MEDICARE

## 2024-05-06 DIAGNOSIS — S72.002A CLOSED FRACTURE OF LEFT HIP, INITIAL ENCOUNTER (HCC): ICD-10-CM

## 2024-05-06 DIAGNOSIS — S72.142A INTERTROCHANTERIC FRACTURE OF LEFT FEMUR, CLOSED, INITIAL ENCOUNTER (HCC): ICD-10-CM
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
## 2024-05-06 PROBLEM — Z87.81 S/P LEFT HIP FRACTURE: Status: ACTIVE | Noted: 2024-05-06

## 2024-05-06 LAB
ALBUMIN SERPL-MCNC: 3.6 G/DL (ref 3.4–5)
ALBUMIN/GLOB SERPL: 1.2 {RATIO} (ref 1.1–2.2)
ALP SERPL-CCNC: 68 U/L (ref 40–129)
ALT SERPL-CCNC: 18 U/L (ref 10–40)
ANION GAP SERPL CALCULATED.3IONS-SCNC: 14 MMOL/L (ref 3–16)
AST SERPL-CCNC: 26 U/L (ref 15–37)
BASOPHILS # BLD: 0.1 K/UL (ref 0–0.2)
BASOPHILS NFR BLD: 0.7 %
BILIRUB SERPL-MCNC: 0.8 MG/DL (ref 0–1)
BUN SERPL-MCNC: 19 MG/DL (ref 7–20)
CALCIUM SERPL-MCNC: 9.1 MG/DL (ref 8.3–10.6)
CHLORIDE SERPL-SCNC: 99 MMOL/L (ref 99–110)
CO2 SERPL-SCNC: 26 MMOL/L (ref 21–32)
CREAT SERPL-MCNC: <0.5 MG/DL (ref 0.6–1.2)
DEPRECATED RDW RBC AUTO: 13.6 % (ref 12.4–15.4)
EOSINOPHIL # BLD: 0.5 K/UL (ref 0–0.6)
EOSINOPHIL NFR BLD: 6.5 %
GFR SERPLBLD CREATININE-BSD FMLA CKD-EPI: >90 ML/MIN/{1.73_M2}
GLUCOSE SERPL-MCNC: 105 MG/DL (ref 70–99)
HCT VFR BLD AUTO: 32.2 % (ref 36–48)
HGB BLD-MCNC: 10.8 G/DL (ref 12–16)
INR PPP: 1.02 (ref 0.85–1.15)
LYMPHOCYTES # BLD: 1.6 K/UL (ref 1–5.1)
LYMPHOCYTES NFR BLD: 20.8 %
MCH RBC QN AUTO: 31.3 PG (ref 26–34)
MCHC RBC AUTO-ENTMCNC: 33.4 G/DL (ref 31–36)
MCV RBC AUTO: 93.6 FL (ref 80–100)
MONOCYTES # BLD: 0.5 K/UL (ref 0–1.3)
MONOCYTES NFR BLD: 6.9 %
NEUTROPHILS # BLD: 5 K/UL (ref 1.7–7.7)
NEUTROPHILS NFR BLD: 65.1 %
PLATELET # BLD AUTO: 290 K/UL (ref 135–450)
PMV BLD AUTO: 7.4 FL (ref 5–10.5)
POTASSIUM SERPL-SCNC: 3.9 MMOL/L (ref 3.5–5.1)
PROT SERPL-MCNC: 6.5 G/DL (ref 6.4–8.2)
PROTHROMBIN TIME: 13.6 SEC (ref 11.9–14.9)
RBC # BLD AUTO: 3.44 M/UL (ref 4–5.2)
SODIUM SERPL-SCNC: 139 MMOL/L (ref 136–145)
TROPONIN, HIGH SENSITIVITY: 18 NG/L (ref 0–14)
TROPONIN, HIGH SENSITIVITY: 20 NG/L (ref 0–14)
WBC # BLD AUTO: 7.7 K/UL (ref 4–11)

## 2024-05-06 PROCEDURE — 1200000000 HC SEMI PRIVATE

## 2024-05-06 PROCEDURE — 36415 COLL VENOUS BLD VENIPUNCTURE: CPT

## 2024-05-06 PROCEDURE — 2580000003 HC RX 258: Performed by: NURSE PRACTITIONER

## 2024-05-06 PROCEDURE — 84484 ASSAY OF TROPONIN QUANT: CPT

## 2024-05-06 PROCEDURE — 96375 TX/PRO/DX INJ NEW DRUG ADDON: CPT

## 2024-05-06 PROCEDURE — 85025 COMPLETE CBC W/AUTO DIFF WBC: CPT

## 2024-05-06 PROCEDURE — 71045 X-RAY EXAM CHEST 1 VIEW: CPT

## 2024-05-06 PROCEDURE — 6360000002 HC RX W HCPCS: Performed by: NURSE PRACTITIONER

## 2024-05-06 PROCEDURE — 99285 EMERGENCY DEPT VISIT HI MDM: CPT

## 2024-05-06 PROCEDURE — 96374 THER/PROPH/DIAG INJ IV PUSH: CPT

## 2024-05-06 PROCEDURE — 73502 X-RAY EXAM HIP UNI 2-3 VIEWS: CPT

## 2024-05-06 PROCEDURE — 6360000002 HC RX W HCPCS: Performed by: PHYSICIAN ASSISTANT

## 2024-05-06 PROCEDURE — 93005 ELECTROCARDIOGRAM TRACING: CPT | Performed by: PHYSICIAN ASSISTANT

## 2024-05-06 PROCEDURE — 80053 COMPREHEN METABOLIC PANEL: CPT

## 2024-05-06 PROCEDURE — 73700 CT LOWER EXTREMITY W/O DYE: CPT

## 2024-05-06 PROCEDURE — 85610 PROTHROMBIN TIME: CPT

## 2024-05-06 RX ORDER — ACETAMINOPHEN 325 MG/1
650 TABLET ORAL EVERY 6 HOURS PRN
Status: DISCONTINUED | OUTPATIENT
Start: 2024-05-06 | End: 2024-05-08

## 2024-05-06 RX ORDER — OXYCODONE HYDROCHLORIDE AND ACETAMINOPHEN 5; 325 MG/1; MG/1
1 TABLET ORAL EVERY 4 HOURS PRN
Status: DISCONTINUED | OUTPATIENT
Start: 2024-05-06 | End: 2024-05-08

## 2024-05-06 RX ORDER — CYCLOBENZAPRINE HCL 10 MG
10 TABLET ORAL 3 TIMES DAILY PRN
Status: DISCONTINUED | OUTPATIENT
Start: 2024-05-06 | End: 2024-05-10 | Stop reason: HOSPADM

## 2024-05-06 RX ORDER — ACETAMINOPHEN 650 MG/1
650 SUPPOSITORY RECTAL EVERY 6 HOURS PRN
Status: DISCONTINUED | OUTPATIENT
Start: 2024-05-06 | End: 2024-05-10 | Stop reason: HOSPADM

## 2024-05-06 RX ORDER — SODIUM CHLORIDE 0.9 % (FLUSH) 0.9 %
5-40 SYRINGE (ML) INJECTION EVERY 12 HOURS SCHEDULED
Status: DISCONTINUED | OUTPATIENT
Start: 2024-05-06 | End: 2024-05-10 | Stop reason: HOSPADM

## 2024-05-06 RX ORDER — SODIUM CHLORIDE 9 MG/ML
INJECTION, SOLUTION INTRAVENOUS CONTINUOUS
Status: ACTIVE | OUTPATIENT
Start: 2024-05-06 | End: 2024-05-07

## 2024-05-06 RX ORDER — POLYETHYLENE GLYCOL 3350 17 G/17G
17 POWDER, FOR SOLUTION ORAL DAILY PRN
Status: DISCONTINUED | OUTPATIENT
Start: 2024-05-06 | End: 2024-05-08

## 2024-05-06 RX ORDER — ONDANSETRON 4 MG/1
4 TABLET, ORALLY DISINTEGRATING ORAL EVERY 8 HOURS PRN
Status: DISCONTINUED | OUTPATIENT
Start: 2024-05-06 | End: 2024-05-08

## 2024-05-06 RX ORDER — SODIUM CHLORIDE 9 MG/ML
INJECTION, SOLUTION INTRAVENOUS PRN
Status: DISCONTINUED | OUTPATIENT
Start: 2024-05-06 | End: 2024-05-10 | Stop reason: HOSPADM

## 2024-05-06 RX ORDER — MORPHINE SULFATE 2 MG/ML
2 INJECTION, SOLUTION INTRAMUSCULAR; INTRAVENOUS
Status: DISCONTINUED | OUTPATIENT
Start: 2024-05-06 | End: 2024-05-08

## 2024-05-06 RX ORDER — ONDANSETRON 2 MG/ML
4 INJECTION INTRAMUSCULAR; INTRAVENOUS EVERY 6 HOURS PRN
Status: DISCONTINUED | OUTPATIENT
Start: 2024-05-06 | End: 2024-05-08

## 2024-05-06 RX ORDER — ONDANSETRON 2 MG/ML
4 INJECTION INTRAMUSCULAR; INTRAVENOUS ONCE
Status: COMPLETED | OUTPATIENT
Start: 2024-05-06 | End: 2024-05-06

## 2024-05-06 RX ORDER — SODIUM CHLORIDE 0.9 % (FLUSH) 0.9 %
5-40 SYRINGE (ML) INJECTION PRN
Status: DISCONTINUED | OUTPATIENT
Start: 2024-05-06 | End: 2024-05-10 | Stop reason: HOSPADM

## 2024-05-06 RX ORDER — MORPHINE SULFATE 2 MG/ML
2 INJECTION, SOLUTION INTRAMUSCULAR; INTRAVENOUS
Status: COMPLETED | OUTPATIENT
Start: 2024-05-06 | End: 2024-05-07

## 2024-05-06 RX ADMIN — MORPHINE SULFATE 2 MG: 2 INJECTION, SOLUTION INTRAMUSCULAR; INTRAVENOUS at 18:26

## 2024-05-06 RX ADMIN — MORPHINE SULFATE 2 MG: 2 INJECTION, SOLUTION INTRAMUSCULAR; INTRAVENOUS at 21:17

## 2024-05-06 RX ADMIN — SODIUM CHLORIDE: 9 INJECTION, SOLUTION INTRAVENOUS at 21:22

## 2024-05-06 RX ADMIN — ONDANSETRON 4 MG: 2 INJECTION INTRAMUSCULAR; INTRAVENOUS at 18:26

## 2024-05-06 ASSESSMENT — PAIN DESCRIPTION - ONSET: ONSET: ON-GOING

## 2024-05-06 ASSESSMENT — PAIN - FUNCTIONAL ASSESSMENT: PAIN_FUNCTIONAL_ASSESSMENT: PREVENTS OR INTERFERES WITH ALL ACTIVE AND SOME PASSIVE ACTIVITIES

## 2024-05-06 ASSESSMENT — PAIN DESCRIPTION - LOCATION: LOCATION: HIP;LEG

## 2024-05-06 ASSESSMENT — PAIN DESCRIPTION - DESCRIPTORS: DESCRIPTORS: ACHING;DISCOMFORT

## 2024-05-06 ASSESSMENT — PAIN DESCRIPTION - ORIENTATION: ORIENTATION: LEFT

## 2024-05-06 ASSESSMENT — PAIN SCALES - GENERAL
PAINLEVEL_OUTOF10: 10
PAINLEVEL_OUTOF10: 8

## 2024-05-06 ASSESSMENT — PAIN DESCRIPTION - FREQUENCY: FREQUENCY: CONTINUOUS

## 2024-05-06 ASSESSMENT — PAIN DESCRIPTION - PAIN TYPE: TYPE: ACUTE PAIN

## 2024-05-06 NOTE — H&P
Hospital Medicine History & Physical      Date of Admission: 5/6/2024    Date of Service:  Pt seen/examined on 5/6/2024    [x]Admitted to Inpatient with expected LOS greater than two midnights due to medical therapy.    []Placed in Observation status.    Chief Admission Complaint: Left hip pain, fall    Presenting Admission History:      78 y.o. female, with essentially no past medical history, who presented to [facility] with left hip pain, fall.  History obtained from the patient and review of EMR.  The patient is from [facility] and speaks very little English.  She reported losing her balance earlier today causing her to fall to the ground landing on her left hip.  She reports being unable to ambulate on her left leg since the fall. Patient denies hitting her head and/or loss of consciousness.  She was taken to the emergency department for further evaluation.  In emergency department an x-ray of the left hip pelvis was obtained that revealed left intertrochanteric hip fracture with angulation and likely comminution.  Orthopedic surgery was consulted in the emergency department and recommended a CT on the left hip.  The CT of the left hip was obtained that revealed an acute, comminuted, displaced and angulated intertrochanteric fracture involving the left hip.  Chronic appearing, nondisplaced bilateral sacral insufficiency fractures.  Patient was given morphine for her pain.  Upon further workup, the patient was found to have an elevated troponin of 18.  However, she denies any chest pain or shortness of breath.  The patient was admitted for further evaluation and treatment.  She denied any other associated symptoms as well as any aggravating and/or alleviating factors. At the time of this assessment, the patient was resting comfortably in bed. He currently denies any chest pain, back pain, abdominal pain, shortness of breath, numbness, tingling, N/V/C/D, fever and/or chills.     Based on the  Left intertrochanteric hip fracture with angulation and likely comminution. 2. No acute cardiopulmonary disease. RECOMMENDATION: Orthopedic consult.     PCP: Axel Magallon MD    Social History:      TOBACCO:   reports that she has never smoked. She has never used smokeless tobacco.    ETOH:   reports current alcohol use.    Family History:  Reviewed in detail and negative for DM, Early CAD, Cancer (except as below). Positive as follows:    History reviewed. No pertinent family history.    Past Medical History:        Diagnosis Date    Hyperlipidemia     Influenza A 12/13/2017    Kidney stones     Vision impairment     wears glasses     Past Surgical History:        Procedure Laterality Date    APPENDECTOMY      BREAST SURGERY      mass 1970    CATARACT REMOVAL  left eye    11/2010    CYSTOSCOPY      EYE SURGERY      rt cataract    PITUITARY SURGERY      benign tumor     Medications Prior to Admission:   Prior to Admission medications    Medication Sig Start Date End Date Taking? Authorizing Provider   vitamin B-12 (CYANOCOBALAMIN) 100 MCG tablet Take 0.5 tablets by mouth daily    ProviderSharmaine MD   cycloSPORINE (RESTASIS) 0.05 % ophthalmic emulsion 1 drop.    ProviderSharmaine MD   Cholecalciferol (VITAMIN D3) 5000 units CAPS Take by mouth    ProviderSharmaine MD     Labs: Personally reviewed and interpreted for clinical significance.   Recent Labs     05/06/24  1827   WBC 7.7   HGB 10.8*   HCT 32.2*        Recent Labs     05/06/24  1827      K 3.9   CL 99   CO2 26   BUN 19   CREATININE <0.5*   CALCIUM 9.1     Recent Labs     05/06/24  1827   TROPHS 18*     Recent Labs     05/06/24  1827   AST 26   ALT 18   BILITOT 0.8   ALKPHOS 68     Recent Labs     05/06/24  1827   INR 1.02     Thank you Axel Magallon MD for the opportunity to be involved in this patient's care. If you have any questions or concerns please feel free to contact me at [phone].     Brie Mayberry, APRN -

## 2024-05-06 NOTE — ED PROVIDER NOTES
[facility] - MED SURG/ORTHO  Emergency Department Encounter    Patient Name: Rubina Werner  MRN: [id number]  YOB: 1945  Date of Evaluation: 5/6/2024  Provider: Axel Magallon MD  Note Started: 6:03 PM EDT 5/6/24    CHIEF COMPLAINT  Fall (Fall that Tuesday, left sided leg shortening, )    SHARED SERVICE VISIT  I have seen and evaluated this patient with my supervising physician, Dr. Girard.     HISTORY OF PRESENT ILLNESS  Rubina Werner is a 78 y.o. female who presents to the ED for evaluation of fall and left hip pain.  Patient states that she stumbled and fell onto the left side.  She denies hitting head or losing consciousness.  No complaints of neck or back pain.  She denies any numbness, tingling, weakness of the extremity.  Uses no blood thinners.  Pain in left hip worse with movement.  Has not been unable to bear weight on the extremity.  Denies any chest or abdominal pain.  She was brought in by squad for evaluation..    No other complaints, modifying factors or associated symptoms.     Nursing notes reviewed were all reviewed and agreed with or any disagreements were addressed in the HPI.    PMH:  Past Medical History:   Diagnosis Date    Hyperlipidemia     Influenza A 12/13/2017    Kidney stones     Vision impairment     wears glasses     Surgical History:  Past Surgical History:   Procedure Laterality Date    APPENDECTOMY      BREAST SURGERY      mass 1970    CATARACT REMOVAL  left eye    11/2010    CYSTOSCOPY      EYE SURGERY      rt cataract    PITUITARY SURGERY      benign tumor     Family History:  History reviewed. No pertinent family history.    Social History:  Social History     Socioeconomic History    Marital status:      Spouse name: Not on file    Number of children: Not on file    Years of education: Not on file    Highest education level: Not on file   Occupational History    Not on file   Tobacco Use    Smoking status: Never    Smokeless tobacco: Never   Vaping

## 2024-05-06 NOTE — ED PROVIDER NOTES
THIS IS MY KEVIN SUPERVISORY AND SHARED VISIT NOTE:    I personally saw the patient and made/approved the management plan and take responsibility for the patient management.    Labs Reviewed   CBC WITH AUTO DIFFERENTIAL - Abnormal; Notable for the following components:       Result Value    RBC 3.44 (*)     Hemoglobin 10.8 (*)     Hematocrit 32.2 (*)     All other components within normal limits   COMPREHENSIVE METABOLIC PANEL - Abnormal; Notable for the following components:    Glucose 105 (*)     Creatinine <0.5 (*)     All other components within normal limits   TROPONIN - Abnormal; Notable for the following components:    Troponin, High Sensitivity 18 (*)     All other components within normal limits   PROTIME-INR     CT HIP LEFT WO CONTRAST   Final Result   1. Acute, comminuted, displaced and angulated intertrochanteric fracture   involving the left hip.   2. Chronic appearing, nondisplaced bilateral sacral insufficiency fractures.   3. Other chronic and incidental findings as above.         XR CHEST PORTABLE   Preliminary Result   1. Left intertrochanteric hip fracture with angulation and likely comminution.   2. No acute cardiopulmonary disease.      RECOMMENDATION:   Orthopedic consult.         XR HIP 2-3 VW W PELVIS LEFT   Preliminary Result   1. Left intertrochanteric hip fracture with angulation and likely comminution.   2. No acute cardiopulmonary disease.      RECOMMENDATION:   Orthopedic consult.           The Ekg interpreted by me shows  sinus tachycardia, xwsp=287  Axis is   Normal  QTc is  normal  Intervals and Durations are unremarkable.      ST Segments: nonspecific changes  Nonspecific T wave abnormalities have improved compared to previous performed 5/6/2022    History: Patient is a 78-year-old female, presenting with concerns for fall and left hip pain.  Patient reportedly had a fall at home earlier today and has been unable to ambulate on her left hip since then.    Exam: Patient is in no

## 2024-05-07 ENCOUNTER — ANESTHESIA EVENT (OUTPATIENT)
Dept: OPERATING ROOM | Age: 79
End: 2024-05-07
Payer: MEDICARE

## 2024-05-07 PROBLEM — E44.0 MODERATE MALNUTRITION (HCC): Status: ACTIVE | Noted: 2024-05-07

## 2024-05-07 PROBLEM — S72.142A INTERTROCHANTERIC FRACTURE OF LEFT FEMUR, CLOSED, INITIAL ENCOUNTER (HCC): Status: ACTIVE | Noted: 2024-05-06

## 2024-05-07 LAB
ANION GAP SERPL CALCULATED.3IONS-SCNC: 7 MMOL/L (ref 3–16)
BASOPHILS # BLD: 0.1 K/UL (ref 0–0.2)
BASOPHILS NFR BLD: 1.1 %
BUN SERPL-MCNC: 15 MG/DL (ref 7–20)
CALCIUM SERPL-MCNC: 8.7 MG/DL (ref 8.3–10.6)
CHLORIDE SERPL-SCNC: 105 MMOL/L (ref 99–110)
CO2 SERPL-SCNC: 29 MMOL/L (ref 21–32)
CREAT SERPL-MCNC: <0.5 MG/DL (ref 0.6–1.2)
DEPRECATED RDW RBC AUTO: 13.5 % (ref 12.4–15.4)
EKG ATRIAL RATE: 101 BPM
EKG DIAGNOSIS: NORMAL
EKG P AXIS: 60 DEGREES
EKG P-R INTERVAL: 132 MS
EKG Q-T INTERVAL: 356 MS
EKG QRS DURATION: 66 MS
EKG QTC CALCULATION (BAZETT): 461 MS
EKG R AXIS: 45 DEGREES
EKG T AXIS: 28 DEGREES
EKG VENTRICULAR RATE: 101 BPM
EOSINOPHIL # BLD: 0.3 K/UL (ref 0–0.6)
EOSINOPHIL NFR BLD: 6.5 %
GFR SERPLBLD CREATININE-BSD FMLA CKD-EPI: >90 ML/MIN/{1.73_M2}
GLUCOSE SERPL-MCNC: 97 MG/DL (ref 70–99)
HCT VFR BLD AUTO: 30 % (ref 36–48)
HGB BLD-MCNC: 10 G/DL (ref 12–16)
LYMPHOCYTES # BLD: 1.1 K/UL (ref 1–5.1)
LYMPHOCYTES NFR BLD: 21.5 %
MCH RBC QN AUTO: 31.3 PG (ref 26–34)
MCHC RBC AUTO-ENTMCNC: 33.2 G/DL (ref 31–36)
MCV RBC AUTO: 94.3 FL (ref 80–100)
MONOCYTES # BLD: 0.4 K/UL (ref 0–1.3)
MONOCYTES NFR BLD: 6.9 %
NEUTROPHILS # BLD: 3.3 K/UL (ref 1.7–7.7)
NEUTROPHILS NFR BLD: 64 %
PLATELET # BLD AUTO: 263 K/UL (ref 135–450)
PMV BLD AUTO: 7.4 FL (ref 5–10.5)
POTASSIUM SERPL-SCNC: 4 MMOL/L (ref 3.5–5.1)
RBC # BLD AUTO: 3.18 M/UL (ref 4–5.2)
SODIUM SERPL-SCNC: 141 MMOL/L (ref 136–145)
TROPONIN, HIGH SENSITIVITY: 18 NG/L (ref 0–14)
WBC # BLD AUTO: 5.2 K/UL (ref 4–11)

## 2024-05-07 PROCEDURE — 99222 1ST HOSP IP/OBS MODERATE 55: CPT | Performed by: ORTHOPAEDIC SURGERY

## 2024-05-07 PROCEDURE — 93010 ELECTROCARDIOGRAM REPORT: CPT | Performed by: INTERNAL MEDICINE

## 2024-05-07 PROCEDURE — 6360000002 HC RX W HCPCS: Performed by: PHYSICIAN ASSISTANT

## 2024-05-07 PROCEDURE — 6360000002 HC RX W HCPCS: Performed by: NURSE PRACTITIONER

## 2024-05-07 PROCEDURE — 36415 COLL VENOUS BLD VENIPUNCTURE: CPT

## 2024-05-07 PROCEDURE — 6370000000 HC RX 637 (ALT 250 FOR IP): Performed by: NURSE PRACTITIONER

## 2024-05-07 PROCEDURE — 84484 ASSAY OF TROPONIN QUANT: CPT

## 2024-05-07 PROCEDURE — 80048 BASIC METABOLIC PNL TOTAL CA: CPT

## 2024-05-07 PROCEDURE — 85025 COMPLETE CBC W/AUTO DIFF WBC: CPT

## 2024-05-07 PROCEDURE — 2580000003 HC RX 258: Performed by: NURSE PRACTITIONER

## 2024-05-07 PROCEDURE — 1200000000 HC SEMI PRIVATE

## 2024-05-07 PROCEDURE — 6370000000 HC RX 637 (ALT 250 FOR IP): Performed by: INTERNAL MEDICINE

## 2024-05-07 RX ORDER — CASTOR OIL AND BALSAM, PERU 788; 87 MG/G; MG/G
OINTMENT TOPICAL 2 TIMES DAILY
Status: DISCONTINUED | OUTPATIENT
Start: 2024-05-07 | End: 2024-05-10 | Stop reason: HOSPADM

## 2024-05-07 RX ORDER — ALENDRONATE SODIUM 70 MG/1
70 TABLET ORAL
COMMUNITY

## 2024-05-07 RX ORDER — CEFAZOLIN SODIUM IN 0.9 % NACL 2 G/100 ML
2000 PLASTIC BAG, INJECTION (ML) INTRAVENOUS
Status: COMPLETED | OUTPATIENT
Start: 2024-05-08 | End: 2024-05-08

## 2024-05-07 RX ADMIN — MORPHINE SULFATE 2 MG: 2 INJECTION, SOLUTION INTRAMUSCULAR; INTRAVENOUS at 12:12

## 2024-05-07 RX ADMIN — Medication 10 ML: at 19:37

## 2024-05-07 RX ADMIN — OXYCODONE AND ACETAMINOPHEN 1 TABLET: 5; 325 TABLET ORAL at 17:52

## 2024-05-07 RX ADMIN — Medication 10 ML: at 20:31

## 2024-05-07 RX ADMIN — CASTOR OIL AND BALSAM, PERU: 788; 87 OINTMENT TOPICAL at 20:39

## 2024-05-07 RX ADMIN — MORPHINE SULFATE 2 MG: 2 INJECTION, SOLUTION INTRAMUSCULAR; INTRAVENOUS at 05:32

## 2024-05-07 RX ADMIN — MORPHINE SULFATE 2 MG: 2 INJECTION, SOLUTION INTRAMUSCULAR; INTRAVENOUS at 20:30

## 2024-05-07 RX ADMIN — Medication 10 ML: at 12:13

## 2024-05-07 ASSESSMENT — PAIN SCALES - GENERAL
PAINLEVEL_OUTOF10: 4
PAINLEVEL_OUTOF10: 9
PAINLEVEL_OUTOF10: 8
PAINLEVEL_OUTOF10: 0
PAINLEVEL_OUTOF10: 6
PAINLEVEL_OUTOF10: 7

## 2024-05-07 ASSESSMENT — PAIN DESCRIPTION - ORIENTATION: ORIENTATION: LEFT

## 2024-05-07 ASSESSMENT — PAIN DESCRIPTION - LOCATION: LOCATION: HIP;LEG

## 2024-05-07 ASSESSMENT — PAIN DESCRIPTION - PAIN TYPE: TYPE: ACUTE PAIN

## 2024-05-07 ASSESSMENT — PAIN DESCRIPTION - ONSET: ONSET: GRADUAL

## 2024-05-07 NOTE — CARE COORDINATION
Writer called to bedside by spouse. Reported 1st preference for SNF is now [facility] and 2nd is [facility]. Referral pending to [facility]. Electronically signed by KASIE BAH RN on 5/7/2024 at 3:39 PM

## 2024-05-07 NOTE — DISCHARGE INSTR - COC
Continuity of Care Form    Patient Name: Rubina Werner   :  1945  MRN:  [id number]    Admit date:  2024  Discharge date:  5/10/2024    Code Status Order: Full Code   Advance Directives:     Admitting Physician:  Ernesto Green MD  PCP: Axel Magallon MD    Discharging Nurse: Angelique MURRAY  Discharging Hospital Unit/Room#: 0530/0530-01  Discharging Unit Phone Number: [phone]    Emergency Contact:   Extended Emergency Contact Information  Primary Emergency Contact: Florentin Werner  Address: [address] [place] [address]            [place] [address] [facility]  Home Phone: [phone]  Mobile Phone: [phone]  Relation: Spouse  Secondary Emergency Contact: Betsy Ramirez  Address: [place]           [id number]  Home Phone: [phone]  Relation: Child    Past Surgical History:  Past Surgical History:   Procedure Laterality Date    APPENDECTOMY      BREAST SURGERY      mass 1970    CATARACT REMOVAL  left eye    2010    CYSTOSCOPY      EYE SURGERY      rt cataract    PITUITARY SURGERY      benign tumor       Immunization History:   Immunization History   Administered Date(s) Administered    COVID-19, MODERNA Bivalent, (age 12y+), IM, 50 mcg/0.5 mL 2022    COVID-19, PFIZER Bivalent, DO NOT Dilute, (age 12y+), IM, 30 mcg/0.3 mL 2023    COVID-19, PFIZER, (- formula), (age 12y+), IM, 30mcg/0.3mL 2023, 03/15/2024       Active Problems:  Patient Active Problem List   Diagnosis Code    Sepsis ([facility]) A41.9    Pubic ramus fracture, left, closed, initial encounter ([facility]) S32.592A    Multiple closed fractures of pelvis ([facility]) S32.82XA    Pleural effusion on left J90    Normocytic normochromic anemia D64.9    Closed left hip fracture ([facility]) S72.002A    S/p left hip fracture Z87.81    Intertrochanteric fracture of left femur, closed, initial encounter ([facility]) S72.142A    Moderate malnutrition ([facility]) E44.0       Isolation/Infection:   Isolation            No Isolation       Summary (Last 24 hours) at 5/7/2024 1746  Last data filed at 5/7/2024 1340  Gross per 24 hour   Intake 120 ml   Output 325 ml   Net -205 ml     No intake/output data recorded.    Safety Concerns:     History of Falls (last 30 days) and At Risk for Falls    Impairments/Disabilities:      None    Nutrition Therapy:  Current Nutrition Therapy:   - Oral Diet:  General    Routes of Feeding: Oral  Liquids: Thin Liquids  Daily Fluid Restriction: no  Last Modified Barium Swallow with Video (Video Swallowing Test): not done    Treatments at the Time of Hospital Discharge:   Respiratory Treatments: N/A  Oxygen Therapy:  is not on home oxygen therapy.  Ventilator:    - No ventilator support    Rehab Therapies: Physical Therapy and Occupational Therapy  Weight Bearing Status/Restrictions: No weight bearing restrictions  Other Medical Equipment (for information only, NOT a DME order):  Stedy  Other Treatments:     Patient's personal belongings (please select all that are sent with patient):  Dentures Partials    RN SIGNATURE:  Electronically signed by Angelique Lara RN on 5/10/24 at 2:01 PM EDT    CASE MANAGEMENT/SOCIAL WORK SECTION    Inpatient Status Date: 5/6/24    Readmission Risk Assessment Score:  Readmission Risk              Risk of Unplanned Readmission:  9           Discharging to Facility/ Agency   [facility]     / signature: Electronically signed by KASIE BAH RN on 5/10/24 at 12:44 PM EDT    PHYSICIAN SECTION    Prognosis: Good    Condition at Discharge: Stable    Rehab Potential (if transferring to Rehab): Good    Recommended Follow-up, Labs or Other Treatments After Discharge:    Keep dressing clean and dry.  Change Mepilex every 3-5 days or as needed for excess drainage.  Please call physician if increased redness around incision, increased drainage, fevers, or pain becomes significantly worse.  Do not immerse in water such as baths but okay to shower with

## 2024-05-07 NOTE — CONSULTS
Comprehensive Nutrition Assessment    Type and Reason for Visit:  Initial, Consult    Nutrition Recommendations/Plan:   Continue regular diet and encourage PO intake   RD to add [employer] BID   RD to order updated weight  Monitor nutrition adequacy, pertinent labs, bowel habits, wt changes, and clinical progress     Malnutrition Assessment:  Malnutrition Status:  Moderate malnutrition (05/07/24 1332)    Context:  Acute Illness     Findings of the 6 clinical characteristics of malnutrition:  Energy Intake:  Mild decrease in energy intake (Comment)  Body Fat Loss:  Mild body fat loss     Muscle Mass Loss:  Moderate muscle mass loss Clavicles (pectoralis & deltoids), Temples (temporalis)    Nutrition Assessment:    Consult for general nutrition management: 78 y.o. f admitted w/ left hip pain, fall. Found to have left intertrochanteric hip fracture. Ortho consulted, plan for operative management of left proximal femur fracture in the form of intramedullary nailing tomorrow. NPO at midnight. On regular diet. Pt difficult to understand, difficult to provide hx. Pt reports varying weights and poor intakes. Pt reports she eats when she wants to. RD encouraged pt to trial a variety of options on the menu, pt did not seem willing to try any. ALEX wt changes d/t stated weight hx, will order new updated weight. Pt lactose intolerant, declined ensure. Will add [employer] and monitor acceptance. Pt meets criteria for malnutrition. Continue to encourage PO intake, will continue to monitor.    Nutrition Related Findings:    LLE + 1 pitting edema. Labs reviewed. No BM. Wound Type: Pressure Injury       Current Nutrition Intake & Therapies:    Average Meal Intake: Unable to assess  Average Supplements Intake: Unable to assess  ADULT DIET; Regular  Diet NPO Exceptions are: Sips of Water with Meds, Ice Chips    Anthropometric Measures:  Height: 160 cm (5' 3\")  Ideal Body Weight (IBW): 115 lbs (52 kg)       Current Body Weight: 44.5 kg  (98 lb), 85.2 % IBW. Weight Source: Bed Scale  Current BMI (kg/m2): 17.4        Weight Adjustment For: No Adjustment                      Estimated Daily Nutrient Needs:  Energy Requirements Based On: Kcal/kg (30-35 kcals/kg)  Weight Used for Energy Requirements: Current (45 kg)  Energy (kcal/day): 9193-8417  Weight Used for Protein Requirements: Current (1.2-1.4 g/kg)  Protein (g/day): 54-63 g  Method Used for Fluid Requirements: 1 ml/kcal  Fluid (ml/day):      Nutrition Diagnosis:   Moderate malnutrition related to inadequate protein-energy intake as evidenced by poor intake prior to admission, BMI, Criteria as identified in malnutrition assessment, mild loss of subcutaneous fat, moderate muscle loss    Nutrition Interventions:   Food and/or Nutrient Delivery: Continue Current Diet, Start Oral Nutrition Supplement  Nutrition Education/Counseling: Education not appropriate  Coordination of Nutrition Care: Continue to monitor while inpatient       Goals:     Goals: PO intake 50% or greater, prior to discharge       Nutrition Monitoring and Evaluation:   Behavioral-Environmental Outcomes: None Identified  Food/Nutrient Intake Outcomes: Food and Nutrient Intake, Supplement Intake  Physical Signs/Symptoms Outcomes: Biochemical Data, Weight, Nutrition Focused Physical Findings    Discharge Planning:    Continue Oral Nutrition Supplement, Continue current diet     Tonja Ramsey, MS, RD, LD  Contact: Office: [phone]; Shun: [phone]

## 2024-05-07 NOTE — PLAN OF CARE
Problem: Discharge Planning  Goal: Discharge to home or other facility with appropriate resources  Outcome: Not Progressing     Problem: Pain  Goal: Verbalizes/displays adequate comfort level or baseline comfort level  Outcome: Not Progressing     Problem: Confusion  Goal: Confusion, delirium, dementia, or psychosis is improved or at baseline  Description: INTERVENTIONS:  1. Assess for possible contributors to thought disturbance, including medications, impaired vision or hearing, underlying metabolic abnormalities, dehydration, psychiatric diagnoses, and notify attending LIP  2. [place] high risk fall precautions, as indicated  3. Provide frequent short contacts to provide reality reorientation, refocusing and direction  4. Decrease environmental stimuli, including noise as appropriate  5. Monitor and intervene to maintain adequate nutrition, hydration, elimination, sleep and activity  6. If unable to ensure safety without constant attention obtain sitter and review sitter guidelines with assigned personnel  7. Initiate Psychosocial CNS and Spiritual Care consult, as indicated  Outcome: Not Progressing

## 2024-05-07 NOTE — H&P (VIEW-ONLY)
likely comminution.  2. No acute cardiopulmonary disease.    ASSESSMENT AND PLAN    78 y.o. female with the following orthopaedic surgery problems:    Left intertrochanteric femur fracture  Osteoporosis    X-ray images were reviewed with detail with the patient and her  today.  I recommended surgical intervention in the form of cephalomedullary nailing of the left hip.  Risk, benefits, alternatives, and standard postoperative recovery course were described.  She will be bedrest until surgical intervention.  Tentative plan for surgery tomorrow morning at 0730 based on operating room resources.  Hold anticoagulants.  N.p.o. midnight.  IV Ancef on-call to the OR surgical prophylaxis.  All questions answered.    Dennis Phillips MD

## 2024-05-07 NOTE — PROGRESS NOTES
This nurse attempted to complete admission documentation. Pt speaks very broken English, and speaks occasional sentences in Romansh. Nurse attempted to phone Pt's emergency contact with no success.

## 2024-05-07 NOTE — PROGRESS NOTES
V2.0    [facility] Progress Note      Name:  Rubina Werner /Age/Sex: 1945  (78 y.o. female)   MRN & CSN:  [id number] & [id number] Encounter Date/Time: 2024 10:20 AM EDT   Location:   PCP: Axel Magallon MD     Attending:Marin Sheldon MD       Hospital Day: 2    Assessment and Recommendations   Rubina Werner is a 78 y.o. female with pmh of osteoporosis who presents with Closed left hip fracture (HCC)      Update: Patient seen at bedside this a.m. with  present.  Patient resting comfortably in bed.  Patient notes that at rest her pain is 4 out of 10, however significantly worsens with movement.  Aside from left hip pain, no other acute complaints noted today.      Plan:   Left Intra-Trochanteric Hip Fracture   - Radiographic and CT findings in Subjective as below.   - Scheduled for IM Nail with Dr. Phillips on 24 in the AM  - Pain medications PRN for symptomatic control.   - DVT prophylaxis to be determined by surgical team post-op  - PT/OT for post-op evaluation   - NPO after midnight   - Patient declining SNF at this time.     Osteoporosis  - Last DEXA Scan in 2022 with severe osteoporosis  - Takes Fosamax weekly in Vitamin D supplement   - Hold for now, continue outpatient.     Coccyx Wound   - Wound care following with dressing changes twice daily.   - Their recommendations appreciated.     Chronic Normocytic Anemia  - Likely 2/2 to Anemia of Chronic Disease at this time.   - No s/s of acute blood loss  - Will need to follow post-op to ensure it remains above 7 and patient is asymptomatic     Protein/Calorie Malnutrition   - Dietitian consulted overnight. Their recommendations appreciated.     Elevated Troponin, Non-MI  - No chest pain on arrival or today  - On arrival, elevated at 18 -> 20 -> 18  - EKG on arrival with Sinus Tach at 101, Qtc 461   - Will continue to follow patient symptoms         Diet ADULT DIET; Regular  Diet NPO Exceptions are: Sips of Water with

## 2024-05-07 NOTE — CONSULTS
(GLYCOLAX) packet 17 g  17 g Oral Daily PRN Shawanda Brie MICHELLE BENJAMIN - JENNIFER        acetaminophen (TYLENOL) tablet 650 mg  650 mg Oral Q6H PRN Shawanda Brie SMICHELLE CNP        Or    acetaminophen (TYLENOL) suppository 650 mg  650 mg Rectal Q6H PRN Shawanda Brie SMICHELLE - CNP        oxyCODONE-acetaminophen (PERCOCET) 5-325 MG per tablet 1 tablet  1 tablet Oral Q4H PRN Leah Mayberrya MICHELLE BENJAMIN - CNP        cyclobenzaprine (FLEXERIL) tablet 10 mg  10 mg Oral TID PRN Brie Mayberry APRN - JENNIFER            Past Surgical History:   Procedure Laterality Date    APPENDECTOMY      BREAST SURGERY      mass 1970    CATARACT REMOVAL  left eye    11/2010    CYSTOSCOPY      EYE SURGERY      rt cataract    PITUITARY SURGERY      benign tumor       Allergies   Allergen Reactions    Lactose Intolerance (Gi)        History reviewed. No pertinent family history.    Social History     Socioeconomic History    Marital status:      Spouse name: Not on file    Number of children: Not on file    Years of education: Not on file    Highest education level: Not on file   Occupational History    Not on file   Tobacco Use    Smoking status: Never    Smokeless tobacco: Never   Vaping Use    Vaping Use: Never used   Substance and Sexual Activity    Alcohol use: Yes     Comment: 2 drinks/day    Drug use: No    Sexual activity: Not on file   Other Topics Concern    Not on file   Social History Narrative    Not on file     Social Determinants of Health     Financial Resource Strain: Not on file   Food Insecurity: No Food Insecurity (5/7/2024)    Hunger Vital Sign     Worried About Running Out of Food in the Last Year: Never true     Ran Out of Food in the Last Year: Never true   Transportation Needs: No Transportation Needs (5/7/2024)    PRAPARE - Transportation     Lack of Transportation (Medical): No     Lack of Transportation (Non-Medical): No   Physical Activity: Not on file   Stress: Not on file   Social Connections: Not on file   Intimate Partner  likely comminution.  2. No acute cardiopulmonary disease.    ASSESSMENT AND PLAN    78 y.o. female with the following orthopaedic surgery problems:    Left intertrochanteric femur fracture  Osteoporosis    X-ray images were reviewed with detail with the patient and her  today.  I recommended surgical intervention in the form of cephalomedullary nailing of the left hip.  Risk, benefits, alternatives, and standard postoperative recovery course were described.  She will be bedrest until surgical intervention.  Tentative plan for surgery tomorrow morning at 0730 based on operating room resources.  Hold anticoagulants.  N.p.o. midnight.  IV Ancef on-call to the OR surgical prophylaxis.  All questions answered.    Dennis Phillips MD

## 2024-05-07 NOTE — CONSULTS
[facility] Nurse  Consult Note       NAME:  Rubina Werner  MEDICAL RECORD NUMBER:  [id number]  AGE: 78 y.o.   GENDER: female  : 1945  TODAY'S DATE:  2024    Subjective \"I was playing with my doggie taking away his bone and slipped and fell on a hard surface and broke my hip.\"   Reason for WOCN Evaluation and Assessment: Possible Deep tissue injury      Rubina Werner is a 78 y.o. female referred by:   [x] Physician  [x] Nursing  [] Other:     Wound Identification:  Wound Type: Pre-existing Deep tissue injury (cluster of small wounds) on coccyx  Contributing Factors: chronic pressure, decreased mobility, shear force, and recent fall.    Wound History: Patient is a 78-year-old female, presenting with concerns for mechanical fall and left hip pain. Found to have a left intertrochanteric hip fracture with angulation likely combination.     Current Wound Care Treatment:  Plan for ORIF tomorrow 24    Patient Goal of Care:  [x] Wound Healing  [] Odor Control  [] Palliative Care  [x] Pain Control   [x] Other: Repair hip fracture per surgery tomorrow         PAST MEDICAL HISTORY        Diagnosis Date    Hyperlipidemia     Influenza A 2017    Kidney stones     Vision impairment     wears glasses       PAST SURGICAL HISTORY    Past Surgical History:   Procedure Laterality Date    APPENDECTOMY      BREAST SURGERY      [facility]    CATARACT REMOVAL  left eye    2010    CYSTOSCOPY      EYE SURGERY      rt cataract    PITUITARY SURGERY      benign tumor       FAMILY HISTORY    History reviewed. No pertinent family history.    SOCIAL HISTORY    Social History     Tobacco Use    Smoking status: Never    Smokeless tobacco: Never   Vaping Use    Vaping Use: Never used   Substance Use Topics    Alcohol use: Yes     Comment: 2 drinks/day    Drug use: No       ALLERGIES    Allergies   Allergen Reactions    Lactose Intolerance (Gi)        MEDICATIONS    No current facility-administered

## 2024-05-07 NOTE — CARE COORDINATION
Case Management Assessment  Initial Evaluation    Date/Time of Evaluation: 5/7/2024 1:35 PM  Assessment Completed by: KASIE BAH RN    If patient is discharged prior to next notation, then this note serves as note for discharge by case management.    Patient Name: Rubina Werner                   YOB: 1945  Diagnosis: Closed fracture of left hip, initial encounter ([facility]) [S72.002A]  Fall, initial encounter [W19.XXXA]  S/p left hip fracture [Z87.81]                   Date / Time: 5/6/2024  5:50 PM    Patient Admission Status: Inpatient   Readmission Risk (Low < 19, Mod (19-27), High > 27): Readmission Risk Score: 10.5    Current PCP: Axel Magallon MD  PCP verified by CM? Yes    Chart Reviewed: Yes      History Provided by: Patient, Significant Other  Patient Orientation: Alert and Oriented    Patient Cognition: Alert    Hospitalization in the last 30 days (Readmission):  No    If yes, Readmission Assessment in  Navigator will be completed.    Advance Directives:      Code Status: Full Code   Patient's Primary Decision Maker is: Legal Next of Kin      Discharge Planning:    Patient lives with: Spouse/Significant Other Type of Home: House  Primary Care Giver: Self  Patient Support Systems include: Spouse/Significant Other, Children   Current Financial resources: Medicare  Current community resources: None  Current services prior to admission: Durable Medical Equipment            Current DME: Walker, Cane, Shower Chair            Type of Home Care services:  None    ADLS  Prior functional level: Assistance with the following:, Cooking, Housework, Shopping  Current functional level: Assistance with the following:, Bathing, Dressing, Toileting, Cooking, Housework, Shopping, Mobility    PT AM-PAC:   /24  OT AM-PAC:   /24    Family can provide assistance at DC: Other (comment) (unsure to what extent they can provide help)  Would you like Case Management to discuss the discharge plan with any  other family members/significant others, and if so, who? Yes (spouse)  Plans to Return to Present Housing: No  Other Identified Issues/Barriers to RETURNING to current housing: Weakness. Debility. Little assist at home except spouse. 2 story home. Fall risk.  Potential Assistance needed at discharge: Skilled Nursing Facility            Potential DME:    Patient expects to discharge to: House  Plan for transportation at discharge: Family    Financial    Payor: [employer] MEDICARE / Plan: [facility] HEALTH [employer] MEDICARE / Product Type: *No Product type* /     Does insurance require precert for SNF: Yes    Potential assistance Purchasing Medications: No  Meds-to-Beds request: Yes      [id number] - [place], [place] - [address] - P [phone] - F [phone]  [address] [facility] [address]  Phone: [phone] Fax: [phone]      Notes:    Factors facilitating achievement of predicted outcomes: Family support, Cooperative, Pleasant, and Has needed Durable Medical Equipment at home    Barriers to discharge: Pain    Additional Case Management Notes: Pt IPTA in a 2 story home w/spouse. Pt has a shower chair, RW and cane, although spouse reports pt does not want to utilize them. Spouse provides transport. Preference of [facility] and [facility] for SNF. Spouse feels he can care for pt at home, writer unsure if spouse understands extensive needs. Active PCP and Insurance. Will follow for needs as they arise.    The Plan for Transition of Care is related to the following treatment goals of Closed fracture of left hip, initial encounter ([facility]) [S72.002A]  Fall, initial encounter [W19.XXXA]  S/p left hip fracture [Z87.81]    IF APPLICABLE: The Patient and/or patient representative Rubina and her family were provided with a choice of provider and agrees with the discharge plan. Freedom of choice list with basic dialogue that supports the patient's individualized plan of care/goals and shares

## 2024-05-08 ENCOUNTER — APPOINTMENT (OUTPATIENT)
Dept: GENERAL RADIOLOGY | Age: 79
End: 2024-05-08
Payer: MEDICARE

## 2024-05-08 ENCOUNTER — ANESTHESIA (OUTPATIENT)
Dept: OPERATING ROOM | Age: 79
End: 2024-05-08
Payer: MEDICARE

## 2024-05-08 LAB
ANION GAP SERPL CALCULATED.3IONS-SCNC: 10 MMOL/L (ref 3–16)
BASOPHILS # BLD: 0 K/UL (ref 0–0.2)
BASOPHILS NFR BLD: 0.8 %
BUN SERPL-MCNC: 22 MG/DL (ref 7–20)
CALCIUM SERPL-MCNC: 8.4 MG/DL (ref 8.3–10.6)
CHLORIDE SERPL-SCNC: 101 MMOL/L (ref 99–110)
CO2 SERPL-SCNC: 24 MMOL/L (ref 21–32)
CREAT SERPL-MCNC: <0.5 MG/DL (ref 0.6–1.2)
DEPRECATED RDW RBC AUTO: 13.7 % (ref 12.4–15.4)
EOSINOPHIL # BLD: 0.5 K/UL (ref 0–0.6)
EOSINOPHIL NFR BLD: 8.2 %
GFR SERPLBLD CREATININE-BSD FMLA CKD-EPI: >90 ML/MIN/{1.73_M2}
GLUCOSE SERPL-MCNC: 94 MG/DL (ref 70–99)
HCT VFR BLD AUTO: 27.6 % (ref 36–48)
HGB BLD-MCNC: 9 G/DL (ref 12–16)
INR PPP: 1.02 (ref 0.85–1.15)
LYMPHOCYTES # BLD: 1.6 K/UL (ref 1–5.1)
LYMPHOCYTES NFR BLD: 26.6 %
MCH RBC QN AUTO: 30.7 PG (ref 26–34)
MCHC RBC AUTO-ENTMCNC: 32.7 G/DL (ref 31–36)
MCV RBC AUTO: 94 FL (ref 80–100)
MONOCYTES # BLD: 0.3 K/UL (ref 0–1.3)
MONOCYTES NFR BLD: 5.7 %
NEUTROPHILS # BLD: 3.5 K/UL (ref 1.7–7.7)
NEUTROPHILS NFR BLD: 58.7 %
PLATELET # BLD AUTO: 291 K/UL (ref 135–450)
PMV BLD AUTO: 7.5 FL (ref 5–10.5)
POTASSIUM SERPL-SCNC: 3.5 MMOL/L (ref 3.5–5.1)
PROTHROMBIN TIME: 13.6 SEC (ref 11.9–14.9)
RBC # BLD AUTO: 2.93 M/UL (ref 4–5.2)
SODIUM SERPL-SCNC: 135 MMOL/L (ref 136–145)
WBC # BLD AUTO: 5.9 K/UL (ref 4–11)

## 2024-05-08 PROCEDURE — 27245 TREAT THIGH FRACTURE: CPT | Performed by: ORTHOPAEDIC SURGERY

## 2024-05-08 PROCEDURE — 6360000002 HC RX W HCPCS: Performed by: PHYSICIAN ASSISTANT

## 2024-05-08 PROCEDURE — 6370000000 HC RX 637 (ALT 250 FOR IP): Performed by: ORTHOPAEDIC SURGERY

## 2024-05-08 PROCEDURE — 2500000003 HC RX 250 WO HCPCS: Performed by: NURSE ANESTHETIST, CERTIFIED REGISTERED

## 2024-05-08 PROCEDURE — 7100000000 HC PACU RECOVERY - FIRST 15 MIN: Performed by: ORTHOPAEDIC SURGERY

## 2024-05-08 PROCEDURE — 3700000000 HC ANESTHESIA ATTENDED CARE: Performed by: ORTHOPAEDIC SURGERY

## 2024-05-08 PROCEDURE — C1769 GUIDE WIRE: HCPCS | Performed by: ORTHOPAEDIC SURGERY

## 2024-05-08 PROCEDURE — 0QS706Z REPOSITION LEFT UPPER FEMUR WITH INTRAMEDULLARY INTERNAL FIXATION DEVICE, OPEN APPROACH: ICD-10-PCS | Performed by: ORTHOPAEDIC SURGERY

## 2024-05-08 PROCEDURE — C1713 ANCHOR/SCREW BN/BN,TIS/BN: HCPCS | Performed by: ORTHOPAEDIC SURGERY

## 2024-05-08 PROCEDURE — 2700000000 HC OXYGEN THERAPY PER DAY

## 2024-05-08 PROCEDURE — 2720000010 HC SURG SUPPLY STERILE: Performed by: ORTHOPAEDIC SURGERY

## 2024-05-08 PROCEDURE — 51798 US URINE CAPACITY MEASURE: CPT

## 2024-05-08 PROCEDURE — 2580000003 HC RX 258: Performed by: NURSE ANESTHETIST, CERTIFIED REGISTERED

## 2024-05-08 PROCEDURE — 80048 BASIC METABOLIC PNL TOTAL CA: CPT

## 2024-05-08 PROCEDURE — 6360000002 HC RX W HCPCS: Performed by: SPECIALIST/TECHNOLOGIST

## 2024-05-08 PROCEDURE — 85025 COMPLETE CBC W/AUTO DIFF WBC: CPT

## 2024-05-08 PROCEDURE — 94761 N-INVAS EAR/PLS OXIMETRY MLT: CPT

## 2024-05-08 PROCEDURE — 2500000003 HC RX 250 WO HCPCS: Performed by: ANESTHESIOLOGY

## 2024-05-08 PROCEDURE — 6370000000 HC RX 637 (ALT 250 FOR IP): Performed by: SPECIALIST/TECHNOLOGIST

## 2024-05-08 PROCEDURE — 6360000002 HC RX W HCPCS: Performed by: NURSE ANESTHETIST, CERTIFIED REGISTERED

## 2024-05-08 PROCEDURE — 6360000002 HC RX W HCPCS: Performed by: ORTHOPAEDIC SURGERY

## 2024-05-08 PROCEDURE — 2500000003 HC RX 250 WO HCPCS: Performed by: ORTHOPAEDIC SURGERY

## 2024-05-08 PROCEDURE — 2709999900 HC NON-CHARGEABLE SUPPLY: Performed by: ORTHOPAEDIC SURGERY

## 2024-05-08 PROCEDURE — 7100000001 HC PACU RECOVERY - ADDTL 15 MIN: Performed by: ORTHOPAEDIC SURGERY

## 2024-05-08 PROCEDURE — 3600000005 HC SURGERY LEVEL 5 BASE: Performed by: ORTHOPAEDIC SURGERY

## 2024-05-08 PROCEDURE — 1200000000 HC SEMI PRIVATE

## 2024-05-08 PROCEDURE — 36415 COLL VENOUS BLD VENIPUNCTURE: CPT

## 2024-05-08 PROCEDURE — 3700000001 HC ADD 15 MINUTES (ANESTHESIA): Performed by: ORTHOPAEDIC SURGERY

## 2024-05-08 PROCEDURE — 85610 PROTHROMBIN TIME: CPT

## 2024-05-08 PROCEDURE — 2580000003 HC RX 258: Performed by: INTERNAL MEDICINE

## 2024-05-08 PROCEDURE — 2580000003 HC RX 258: Performed by: ORTHOPAEDIC SURGERY

## 2024-05-08 PROCEDURE — 73502 X-RAY EXAM HIP UNI 2-3 VIEWS: CPT

## 2024-05-08 PROCEDURE — 3600000015 HC SURGERY LEVEL 5 ADDTL 15MIN: Performed by: ORTHOPAEDIC SURGERY

## 2024-05-08 DEVICE — SCREW BNE L90MM DIA10.35MM PROX FEM G TI CANN FOR TFN ADV: Type: IMPLANTABLE DEVICE | Site: HIP | Status: FUNCTIONAL

## 2024-05-08 DEVICE — SCREW LCK 5X32MM W/T25 STRDRV F/IM NAIL STRL: Type: IMPLANTABLE DEVICE | Site: HIP | Status: FUNCTIONAL

## 2024-05-08 DEVICE — NAIL IM L235MM DIA12MM 130DEG SHT GRN L PROX FEM TI: Type: IMPLANTABLE DEVICE | Site: HIP | Status: FUNCTIONAL

## 2024-05-08 RX ORDER — MORPHINE SULFATE 2 MG/ML
1 INJECTION, SOLUTION INTRAMUSCULAR; INTRAVENOUS
Status: DISCONTINUED | OUTPATIENT
Start: 2024-05-08 | End: 2024-05-10 | Stop reason: HOSPADM

## 2024-05-08 RX ORDER — BUPIVACAINE HYDROCHLORIDE AND EPINEPHRINE 5; 5 MG/ML; UG/ML
INJECTION, SOLUTION PERINEURAL PRN
Status: DISCONTINUED | OUTPATIENT
Start: 2024-05-08 | End: 2024-05-08 | Stop reason: ALTCHOICE

## 2024-05-08 RX ORDER — PROPOFOL 10 MG/ML
INJECTION, EMULSION INTRAVENOUS PRN
Status: DISCONTINUED | OUTPATIENT
Start: 2024-05-08 | End: 2024-05-08 | Stop reason: SDUPTHER

## 2024-05-08 RX ORDER — ACETAMINOPHEN 325 MG/1
650 TABLET ORAL EVERY 6 HOURS
Status: DISCONTINUED | OUTPATIENT
Start: 2024-05-08 | End: 2024-05-10 | Stop reason: HOSPADM

## 2024-05-08 RX ORDER — SODIUM CHLORIDE 0.9 % (FLUSH) 0.9 %
5-40 SYRINGE (ML) INJECTION PRN
Status: DISCONTINUED | OUTPATIENT
Start: 2024-05-08 | End: 2024-05-10 | Stop reason: HOSPADM

## 2024-05-08 RX ORDER — ENOXAPARIN SODIUM 100 MG/ML
40 INJECTION SUBCUTANEOUS DAILY
Status: DISCONTINUED | OUTPATIENT
Start: 2024-05-09 | End: 2024-05-08 | Stop reason: DRUGHIGH

## 2024-05-08 RX ORDER — OXYCODONE HYDROCHLORIDE 5 MG/1
10 TABLET ORAL PRN
Status: DISCONTINUED | OUTPATIENT
Start: 2024-05-08 | End: 2024-05-08 | Stop reason: HOSPADM

## 2024-05-08 RX ORDER — 0.9 % SODIUM CHLORIDE 0.9 %
500 INTRAVENOUS SOLUTION INTRAVENOUS ONCE
Status: COMPLETED | OUTPATIENT
Start: 2024-05-08 | End: 2024-05-08

## 2024-05-08 RX ORDER — POLYETHYLENE GLYCOL 3350 17 G/17G
17 POWDER, FOR SOLUTION ORAL DAILY
Qty: 30 PACKET | Refills: 0 | Status: SHIPPED | OUTPATIENT
Start: 2024-05-09 | End: 2024-06-08

## 2024-05-08 RX ORDER — CEFAZOLIN SODIUM IN 0.9 % NACL 2 G/100 ML
1000 PLASTIC BAG, INJECTION (ML) INTRAVENOUS EVERY 8 HOURS
Status: DISCONTINUED | OUTPATIENT
Start: 2024-05-08 | End: 2024-05-08

## 2024-05-08 RX ORDER — LIDOCAINE HYDROCHLORIDE 20 MG/ML
INJECTION, SOLUTION INFILTRATION; PERINEURAL PRN
Status: DISCONTINUED | OUTPATIENT
Start: 2024-05-08 | End: 2024-05-08 | Stop reason: SDUPTHER

## 2024-05-08 RX ORDER — NALOXONE HYDROCHLORIDE 0.4 MG/ML
INJECTION, SOLUTION INTRAMUSCULAR; INTRAVENOUS; SUBCUTANEOUS PRN
Status: DISCONTINUED | OUTPATIENT
Start: 2024-05-08 | End: 2024-05-08 | Stop reason: HOSPADM

## 2024-05-08 RX ORDER — LABETALOL HYDROCHLORIDE 5 MG/ML
10 INJECTION, SOLUTION INTRAVENOUS
Status: DISCONTINUED | OUTPATIENT
Start: 2024-05-08 | End: 2024-05-08 | Stop reason: HOSPADM

## 2024-05-08 RX ORDER — DEXAMETHASONE SODIUM PHOSPHATE 4 MG/ML
INJECTION, SOLUTION INTRA-ARTICULAR; INTRALESIONAL; INTRAMUSCULAR; INTRAVENOUS; SOFT TISSUE PRN
Status: DISCONTINUED | OUTPATIENT
Start: 2024-05-08 | End: 2024-05-08 | Stop reason: SDUPTHER

## 2024-05-08 RX ORDER — SODIUM CHLORIDE 0.9 % (FLUSH) 0.9 %
5-40 SYRINGE (ML) INJECTION EVERY 12 HOURS SCHEDULED
Status: DISCONTINUED | OUTPATIENT
Start: 2024-05-08 | End: 2024-05-10 | Stop reason: HOSPADM

## 2024-05-08 RX ORDER — ONDANSETRON 2 MG/ML
4 INJECTION INTRAMUSCULAR; INTRAVENOUS EVERY 6 HOURS PRN
Status: DISCONTINUED | OUTPATIENT
Start: 2024-05-08 | End: 2024-05-10 | Stop reason: HOSPADM

## 2024-05-08 RX ORDER — ACETAMINOPHEN 325 MG/1
650 TABLET ORAL EVERY 6 HOURS
Qty: 240 TABLET | Refills: 0 | Status: SHIPPED | OUTPATIENT
Start: 2024-05-08 | End: 2024-06-07

## 2024-05-08 RX ORDER — FENTANYL CITRATE 50 UG/ML
INJECTION, SOLUTION INTRAMUSCULAR; INTRAVENOUS PRN
Status: DISCONTINUED | OUTPATIENT
Start: 2024-05-08 | End: 2024-05-08 | Stop reason: SDUPTHER

## 2024-05-08 RX ORDER — ENOXAPARIN SODIUM 100 MG/ML
30 INJECTION SUBCUTANEOUS DAILY
Status: DISCONTINUED | OUTPATIENT
Start: 2024-05-09 | End: 2024-05-10 | Stop reason: HOSPADM

## 2024-05-08 RX ORDER — OXYCODONE HYDROCHLORIDE 5 MG/1
2.5 TABLET ORAL EVERY 6 HOURS PRN
Qty: 14 TABLET | Refills: 0 | Status: SHIPPED | OUTPATIENT
Start: 2024-05-08 | End: 2024-05-15

## 2024-05-08 RX ORDER — SODIUM CHLORIDE, SODIUM LACTATE, POTASSIUM CHLORIDE, CALCIUM CHLORIDE 600; 310; 30; 20 MG/100ML; MG/100ML; MG/100ML; MG/100ML
INJECTION, SOLUTION INTRAVENOUS CONTINUOUS PRN
Status: DISCONTINUED | OUTPATIENT
Start: 2024-05-08 | End: 2024-05-08 | Stop reason: SDUPTHER

## 2024-05-08 RX ORDER — ONDANSETRON 2 MG/ML
4 INJECTION INTRAMUSCULAR; INTRAVENOUS
Status: DISCONTINUED | OUTPATIENT
Start: 2024-05-08 | End: 2024-05-08

## 2024-05-08 RX ORDER — ROCURONIUM BROMIDE 10 MG/ML
INJECTION, SOLUTION INTRAVENOUS PRN
Status: DISCONTINUED | OUTPATIENT
Start: 2024-05-08 | End: 2024-05-08 | Stop reason: SDUPTHER

## 2024-05-08 RX ORDER — SODIUM CHLORIDE 0.9 % (FLUSH) 0.9 %
5-40 SYRINGE (ML) INJECTION EVERY 12 HOURS SCHEDULED
Status: DISCONTINUED | OUTPATIENT
Start: 2024-05-08 | End: 2024-05-08 | Stop reason: HOSPADM

## 2024-05-08 RX ORDER — MEPERIDINE HYDROCHLORIDE 50 MG/ML
12.5 INJECTION INTRAMUSCULAR; INTRAVENOUS; SUBCUTANEOUS EVERY 5 MIN PRN
Status: DISCONTINUED | OUTPATIENT
Start: 2024-05-08 | End: 2024-05-08 | Stop reason: HOSPADM

## 2024-05-08 RX ORDER — SODIUM CHLORIDE 9 MG/ML
INJECTION, SOLUTION INTRAVENOUS PRN
Status: DISCONTINUED | OUTPATIENT
Start: 2024-05-08 | End: 2024-05-10 | Stop reason: HOSPADM

## 2024-05-08 RX ORDER — SODIUM CHLORIDE 0.9 % (FLUSH) 0.9 %
5-40 SYRINGE (ML) INJECTION PRN
Status: DISCONTINUED | OUTPATIENT
Start: 2024-05-08 | End: 2024-05-08 | Stop reason: HOSPADM

## 2024-05-08 RX ORDER — OXYCODONE HYDROCHLORIDE 5 MG/1
5 TABLET ORAL EVERY 4 HOURS PRN
Status: DISCONTINUED | OUTPATIENT
Start: 2024-05-08 | End: 2024-05-10 | Stop reason: HOSPADM

## 2024-05-08 RX ORDER — CYCLOBENZAPRINE HCL 10 MG
10 TABLET ORAL 3 TIMES DAILY PRN
Qty: 30 TABLET | Refills: 0 | Status: SHIPPED | OUTPATIENT
Start: 2024-05-08 | End: 2024-05-18

## 2024-05-08 RX ORDER — ONDANSETRON 4 MG/1
4 TABLET, ORALLY DISINTEGRATING ORAL EVERY 8 HOURS PRN
Status: DISCONTINUED | OUTPATIENT
Start: 2024-05-08 | End: 2024-05-10 | Stop reason: HOSPADM

## 2024-05-08 RX ORDER — SODIUM CHLORIDE 9 MG/ML
INJECTION, SOLUTION INTRAVENOUS PRN
Status: DISCONTINUED | OUTPATIENT
Start: 2024-05-08 | End: 2024-05-08 | Stop reason: HOSPADM

## 2024-05-08 RX ORDER — PHENYLEPHRINE HCL IN 0.9% NACL 1 MG/10 ML
SYRINGE (ML) INTRAVENOUS PRN
Status: DISCONTINUED | OUTPATIENT
Start: 2024-05-08 | End: 2024-05-08 | Stop reason: SDUPTHER

## 2024-05-08 RX ORDER — DIPHENHYDRAMINE HYDROCHLORIDE 50 MG/ML
12.5 INJECTION INTRAMUSCULAR; INTRAVENOUS
Status: DISCONTINUED | OUTPATIENT
Start: 2024-05-08 | End: 2024-05-08 | Stop reason: HOSPADM

## 2024-05-08 RX ORDER — POLYETHYLENE GLYCOL 3350 17 G/17G
17 POWDER, FOR SOLUTION ORAL DAILY
Status: DISCONTINUED | OUTPATIENT
Start: 2024-05-09 | End: 2024-05-10 | Stop reason: HOSPADM

## 2024-05-08 RX ORDER — OXYCODONE HYDROCHLORIDE 5 MG/1
2.5 TABLET ORAL EVERY 4 HOURS PRN
Status: DISCONTINUED | OUTPATIENT
Start: 2024-05-08 | End: 2024-05-10 | Stop reason: HOSPADM

## 2024-05-08 RX ORDER — EPHEDRINE SULFATE 50 MG/ML
INJECTION INTRAVENOUS PRN
Status: DISCONTINUED | OUTPATIENT
Start: 2024-05-08 | End: 2024-05-08 | Stop reason: SDUPTHER

## 2024-05-08 RX ORDER — MORPHINE SULFATE 2 MG/ML
2 INJECTION, SOLUTION INTRAMUSCULAR; INTRAVENOUS
Status: DISCONTINUED | OUTPATIENT
Start: 2024-05-08 | End: 2024-05-10 | Stop reason: HOSPADM

## 2024-05-08 RX ORDER — OXYCODONE HYDROCHLORIDE 5 MG/1
5 TABLET ORAL PRN
Status: DISCONTINUED | OUTPATIENT
Start: 2024-05-08 | End: 2024-05-08 | Stop reason: HOSPADM

## 2024-05-08 RX ORDER — ONDANSETRON 2 MG/ML
INJECTION INTRAMUSCULAR; INTRAVENOUS PRN
Status: DISCONTINUED | OUTPATIENT
Start: 2024-05-08 | End: 2024-05-08 | Stop reason: SDUPTHER

## 2024-05-08 RX ORDER — ENOXAPARIN SODIUM 100 MG/ML
30 INJECTION SUBCUTANEOUS DAILY
Qty: 9 ML | Refills: 0 | Status: SHIPPED | OUTPATIENT
Start: 2024-05-09 | End: 2024-06-08

## 2024-05-08 RX ORDER — TRANEXAMIC ACID 100 MG/ML
INJECTION, SOLUTION INTRAVENOUS PRN
Status: DISCONTINUED | OUTPATIENT
Start: 2024-05-08 | End: 2024-05-08 | Stop reason: SDUPTHER

## 2024-05-08 RX ADMIN — Medication 2 AMPULE: at 06:21

## 2024-05-08 RX ADMIN — Medication 200 MCG: at 08:05

## 2024-05-08 RX ADMIN — SODIUM CHLORIDE 500 ML: 9 INJECTION, SOLUTION INTRAVENOUS at 12:20

## 2024-05-08 RX ADMIN — SODIUM CHLORIDE, PRESERVATIVE FREE 10 ML: 5 INJECTION INTRAVENOUS at 11:12

## 2024-05-08 RX ADMIN — EPHEDRINE SULFATE 20 MG: 50 INJECTION INTRAVENOUS at 08:00

## 2024-05-08 RX ADMIN — Medication 100 MCG: at 07:55

## 2024-05-08 RX ADMIN — MORPHINE SULFATE 2 MG: 2 INJECTION, SOLUTION INTRAMUSCULAR; INTRAVENOUS at 05:37

## 2024-05-08 RX ADMIN — Medication 2000 MG: at 07:41

## 2024-05-08 RX ADMIN — SODIUM CHLORIDE, SODIUM LACTATE, POTASSIUM CHLORIDE, AND CALCIUM CHLORIDE: .6; .31; .03; .02 INJECTION, SOLUTION INTRAVENOUS at 09:00

## 2024-05-08 RX ADMIN — ROCURONIUM BROMIDE 50 MG: 50 INJECTION, SOLUTION INTRAVENOUS at 07:44

## 2024-05-08 RX ADMIN — Medication 100 MCG: at 08:00

## 2024-05-08 RX ADMIN — TRANEXAMIC ACID 700 MG: 100 INJECTION, SOLUTION INTRAVENOUS at 07:51

## 2024-05-08 RX ADMIN — Medication 1000 MG: at 16:36

## 2024-05-08 RX ADMIN — LIDOCAINE HYDROCHLORIDE 40 MG: 20 INJECTION, SOLUTION INFILTRATION; PERINEURAL at 07:44

## 2024-05-08 RX ADMIN — FENTANYL CITRATE 50 MCG: 50 INJECTION, SOLUTION INTRAMUSCULAR; INTRAVENOUS at 08:59

## 2024-05-08 RX ADMIN — MORPHINE SULFATE 2 MG: 2 INJECTION, SOLUTION INTRAMUSCULAR; INTRAVENOUS at 09:20

## 2024-05-08 RX ADMIN — FENTANYL CITRATE 25 MCG: 50 INJECTION, SOLUTION INTRAMUSCULAR; INTRAVENOUS at 08:15

## 2024-05-08 RX ADMIN — Medication 200 MCG: at 07:49

## 2024-05-08 RX ADMIN — ONDANSETRON 4 MG: 2 INJECTION INTRAMUSCULAR; INTRAVENOUS at 08:37

## 2024-05-08 RX ADMIN — PROPOFOL 70 MG: 10 INJECTION, EMULSION INTRAVENOUS at 07:44

## 2024-05-08 RX ADMIN — Medication 200 MCG: at 07:53

## 2024-05-08 RX ADMIN — OXYCODONE AND ACETAMINOPHEN 1 TABLET: 5; 325 TABLET ORAL at 10:59

## 2024-05-08 RX ADMIN — SUGAMMADEX 200 MG: 100 INJECTION, SOLUTION INTRAVENOUS at 08:47

## 2024-05-08 RX ADMIN — SODIUM CHLORIDE, SODIUM LACTATE, POTASSIUM CHLORIDE, AND CALCIUM CHLORIDE: .6; .31; .03; .02 INJECTION, SOLUTION INTRAVENOUS at 07:41

## 2024-05-08 RX ADMIN — CASTOR OIL AND BALSAM, PERU: 788; 87 OINTMENT TOPICAL at 12:30

## 2024-05-08 RX ADMIN — FENTANYL CITRATE 25 MCG: 50 INJECTION, SOLUTION INTRAMUSCULAR; INTRAVENOUS at 07:44

## 2024-05-08 RX ADMIN — ACETAMINOPHEN 650 MG: 325 TABLET ORAL at 18:28

## 2024-05-08 RX ADMIN — ACETAMINOPHEN 650 MG: 325 TABLET ORAL at 13:03

## 2024-05-08 RX ADMIN — DEXAMETHASONE SODIUM PHOSPHATE 8 MG: 4 INJECTION, SOLUTION INTRAMUSCULAR; INTRAVENOUS at 08:37

## 2024-05-08 ASSESSMENT — PAIN DESCRIPTION - DESCRIPTORS
DESCRIPTORS: ACHING
DESCRIPTORS: ACHING;DISCOMFORT

## 2024-05-08 ASSESSMENT — PAIN DESCRIPTION - LOCATION
LOCATION: LEG;HIP
LOCATION: LEG;HIP
LOCATION: HIP

## 2024-05-08 ASSESSMENT — PAIN SCALES - GENERAL
PAINLEVEL_OUTOF10: 6
PAINLEVEL_OUTOF10: 4
PAINLEVEL_OUTOF10: 2
PAINLEVEL_OUTOF10: 0
PAINLEVEL_OUTOF10: 5
PAINLEVEL_OUTOF10: 0
PAINLEVEL_OUTOF10: 6

## 2024-05-08 ASSESSMENT — PAIN DESCRIPTION - ORIENTATION
ORIENTATION: LEFT

## 2024-05-08 ASSESSMENT — PAIN - FUNCTIONAL ASSESSMENT: PAIN_FUNCTIONAL_ASSESSMENT: 0-10

## 2024-05-08 NOTE — ANESTHESIA PRE PROCEDURE
HCG (If Applicable): No results found for: \"PREGTESTUR\", \"PREGSERUM\", \"HCG\", \"HCGQUANT\"     ABGs: No results found for: \"PHART\", \"PO2ART\", \"VLA8GQP\", \"UAW9IKH\", \"BEART\", \"Y0ESPGKP\"     Type & Screen (If Applicable):  No results found for: \"LABABO\"    Drug/Infectious Status (If Applicable):  No results found for: \"HIV\", \"HEPCAB\"    COVID-19 Screening (If Applicable):   Lab Results   Component Value Date/Time    COVID19 Not Detected 05/10/2022 01:32 PM           Anesthesia Evaluation     history of anesthetic complications: PONV.  Airway: Mallampati: III  TM distance: <3 FB   Neck ROM: limited  Mouth opening: > = 3 FB   Dental: normal exam         Pulmonary:Negative Pulmonary ROS                              Cardiovascular:    (+) hyperlipidemia                  Neuro/Psych:   Negative Neuro/Psych ROS              GI/Hepatic/Renal:   (+) renal disease: kidney stones          Endo/Other: Negative Endo/Other ROS                     ROS comment: H/o anemia Abdominal:             Vascular: negative vascular ROS.         Other Findings:         Anesthesia Plan      general     ASA 2     (Pt agrees to risks, benefits and alternatives of GETA.  Questions answered.  Willing to proceed with plan.)  Induction: intravenous.      Anesthetic plan and risks discussed with patient.                    KATYA NAVARRO MD   5/8/2024

## 2024-05-08 NOTE — PROGRESS NOTES
Received patient from PACU, Alert.  Iv in right forearm edematous  and discontinued.  Pain level 4/10.  Bed in low locked position, call light in reach.  Bed alarm engaged.

## 2024-05-08 NOTE — OP NOTE
1157       Findings:  Infection Present At Time Of Surgery (PATOS) (choose all levels that have infection present):  No infection present  Other Findings: displaced intertrochanteric femur fracture, reducible via closed methods.    Detailed Description of Procedure:   The patient was positively identified in the preoperative holding area by the attending surgeon.  Informed consent was verified and placed on the chart.  The left lower extremity was marked appropriately.  She was given 2 g IV Ancef for antibiotic prophylaxis.  A fascial iliac block was administered per anesthesia prior to surgery.  She was then taken to the operating room by the anesthesia team.  General anesthesia was induced.  She was transitioned to the [place] table and laid supine with all bony prominences well-padded.  A perineal post was placed.  Each limb was secured in the spars/traction boot.  The operative limb was placed into slight adduction, internal rotation, and traction.  The nonoperative limb was scissored into extension to allow for lateral imaging.  At this point C-arm fluoroscopy was brought in to evaluate the ability to obtain intraoperative imaging as well as the reduction.  Reduction was fine-tuned with additional traction and rotational alignment.  A near anatomic reduction was achieved on AP and lateral projections.  At this point, the limb was sterilely prepped and draped in standard fashion.  A timeout was held to verify the correct patient, operative site, laterality, and procedure.  Everyone in the room was in agreement.  At this point, a 4 cm incision was made proximal to the tip of the palpable greater trochanter.  Dissection was carried sharply through the skin and subcutaneous tissues.  Iliotibial band was palpated.  This was also split in line with the incision.  Electrocautery was used to maintain hemostasis.  At this point, a guidepin was placed at the medial aspect of the tip of the greater trochanter.  This was  to begin POD number 1 in the AM  Wound care: Mepilex dressings can remain in place x7 days unless saturated, then may be changed earlier.  Anticipate discharge in 1 to 2 days postoperatively.     Electronically signed by Dennis Phillips MD on 5/8/2024 at 8:35 AM

## 2024-05-08 NOTE — CARE COORDINATION
Chart reviewed day 2. Pt is followed by IM and Ortho. Post op today L hip fracture. Anticipate need for SNF; spouse toured [facility] this am and facility has accepted. Will need cert for SNF. Will follow for needs as they arise. Electronically signed by KASIE BAH RN on 5/8/2024 at 1:47 PM

## 2024-05-08 NOTE — PLAN OF CARE
Problem: Skin/Tissue Integrity  Goal: Absence of new skin breakdown  Description: 1.  Monitor for areas of redness and/or skin breakdown  2.  Assess vascular access sites hourly  3.  Every 4-6 hours minimum:  Change oxygen saturation probe site  4.  Every 4-6 hours:  If on nasal continuous positive airway pressure, respiratory therapy assess nares and determine need for appliance change or resting period.  Outcome: Progressing     Problem: Safety - Adult  Goal: Free from fall injury  Outcome: Progressing     Problem: ABCDS Injury Assessment  Goal: Absence of physical injury  Outcome: Progressing     Problem: Discharge Planning  Goal: Discharge to home or other facility with appropriate resources  Outcome: Not Progressing  Flowsheets (Taken 5/7/2024 2000)  Discharge to home or other facility with appropriate resources:   Identify barriers to discharge with patient and caregiver   Arrange for needed discharge resources and transportation as appropriate   Identify discharge learning needs (meds, wound care, etc)     Problem: Pain  Goal: Verbalizes/displays adequate comfort level or baseline comfort level  Outcome: Not Progressing  Flowsheets (Taken 5/7/2024 1905)  Verbalizes/displays adequate comfort level or baseline comfort level:   Encourage patient to monitor pain and request assistance   Administer analgesics based on type and severity of pain and evaluate response   Implement non-pharmacological measures as appropriate and evaluate response   Assess pain using appropriate pain scale     Problem: Confusion  Goal: Confusion, delirium, dementia, or psychosis is improved or at baseline  Description: INTERVENTIONS:  1. Assess for possible contributors to thought disturbance, including medications, impaired vision or hearing, underlying metabolic abnormalities, dehydration, psychiatric diagnoses, and notify attending LIP  2. [facility] high risk fall precautions, as indicated  3. Provide frequent short contacts to

## 2024-05-08 NOTE — DISCHARGE INSTRUCTIONS
Keep dressing clean and dry.  Change Mepilex every 3-5 days or as needed for excess drainage.  Please call physician if increased redness around incision, increased drainage, fevers, or pain becomes significantly worse.  Do not immerse in water such as baths but okay to shower with dressing on to protect wound.    F/U with Dr Dennis Phillips in 10-14 days.  Call [facility] for appointment time and date for follow up at [phone].        Weight Bearing on Surgical Side: full    Continue DVT Prophylaxis: Lovenox 30mg subcut daily for 30 days    Thigh high compression stockings to be worn on both legs for 30 days post-op. Put the compression stockings on in the morning, take off before you go to bed    Pain Medications  You were given oxycodone (Oxycontin, Oxyir)  Wean off pain medications as you deem appropriate as long as pain is under control  Be sure to drink plenty of fluids (recommend water) while taking narcotic pain medications to prevent constipation   You may take an over the counter laxative or stool softener as needed to prevent/treat constipation as well, we recommend miralax/glycolax.  We recommend that you consider taking these medications the entire time you are taking pain medication.  Cold packs/Ice packs/Machine  May be used as much as necessary to control swelling/inflammation/soreness  Be sure to have a barrier (cloth, clothing, towel) between the site and the ice pack to prevent frostbite  Contact [facility] office [phone] if  Increased redness, swelling, drainage of any kind, and/or pain to surgery site.  As well as new onset fevers and or chills.  These could signify an infection.  Calf or thigh tenderness to touch as well as increased swelling or redness.  This could signify a clot formation.  Numbness or tingling to an area around the incision site or below the incision site (toes).  Any rash appears, increased  or new onset nausea/vomiting occur.  This may  indicate a reaction to a medication.

## 2024-05-08 NOTE — PROGRESS NOTES
Physician Progress Note      PATIENT:               HUSSEIN MEZA  CSN #:                  [id number]  :                       1945  ADMIT DATE:       2024 5:50 PM  DISCH DATE:  RESPONDING  PROVIDER #:        BENJI RUGGIERO          QUERY TEXT:    Pt admitted with left hip fracture. Pt noted to have osteoporosis. If   possible, please document in progress notes and discharge summary if you are   evaluating and/or treating any of the following:    The medical record reflects the following:  Risk Factors: 78 yr old female, osteoporosis, left hip fx    Clinical Indicators: per IM/resident note -\"Osteoporosis- Last DEXA Scan in   2022 with severe osteoporosis- Takes Fosamax weekly in Vitamin D   supplement\"    Treatment: Imaging, ortho consult, IM nailing, supportive care    Thank you,  Shahida Deleon RN BSN CRCR CCDS  [email or web address] Information  Options provided:  -- Osteoporotic left hip fracture following fall which would not usually break   a normal, healthy bone  -- Other - I will add my own diagnosis  -- Disagree - Not applicable / Not valid  -- Disagree - Clinically unable to determine / Unknown  -- Refer to Clinical Documentation Reviewer    PROVIDER RESPONSE TEXT:    Provider is clinically unable to determine a response to this query.  Cannot determine if osteoporosis was underlying cause of fracture or if 2/2 to   acute mechanical fall from source.    Query created by: Nano Deleon on 2024 12:10 PM      Electronically signed by:  BENJI RUGGIERO 2024 1:43 PM

## 2024-05-08 NOTE — PLAN OF CARE
Protect patient from fall injury by keeping bed in low position, use of non skid socks and bed alarm system. Keep belongings and call light with reach at all times and following fall protocol. Encourage patient to ask for pain medication before pain is above a 5 on the 1/10 pain scale.  Medicate before PT or increased activity. Use alternatives such as ice (if ordered) or distraction as often as possible to minimize  need for medication.   Monitor for orthostatic hypotension, and for electrolyte imbalance.   Turn every 2 hours , keep heels off bed, monitor decubiti on coccyx, and treat as needed.    Keep I and O encourage food and fluids.

## 2024-05-08 NOTE — PROGRESS NOTES
Patient arrived to PACU bay 5, phase one initiated. Placed on bedside monitor, VSS. Report obtained from OR RN and anesthesia. Patient on O2 via nasal canula at 2L. Assessment WNL. Incisions on left hip are dressed with mepilex dressings. No drainage present. Warm blankets applied. Side rails in place, will monitor patient closely.

## 2024-05-08 NOTE — PROGRESS NOTES
V2.0    [facility] [place]      Name:  Rubina Werner /Age/Sex: 1945  (78 y.o. female)   MRN & CSN:  [id number] & [id number] Encounter Date/Time: 2024 10:20 AM EDT   Location:   PCP: Axel Magallon MD     Attending:Marin Sheldon MD       Hospital Day: 2    Assessment and Recommendations   Rubina Werner is a 78 y.o. female with pmh of osteoporosis who presents with Closed left hip fracture (HCC)      Update: Patient seen at bedside this AM after returning from PACU.  present at bedside who notes he will go to look at several SNF's later today. Patient now reporting improved symptoms since yesterday. Pain is well controlled so long as LLE is not moving. Overall, she tolerated procedure well and denies acute complaints at this time.       Plan:   Left Intra-Trochanteric Hip Fracture   - Radiographic and CT findings in Subjective as below.   - Patient completed IM Nail with Dr. Dennis Phillips on 24   - Patient now contemplative of SNF vs. [facility] with [facility]  - Ortho following. Their recommendations appreciated:    - WBAT to LLE. Walker to assist.    - PT/OT post-op   - PRN Pain medications    - IV Ancef 24 hours post-op   - Lovenox for DVT prophylaxis tomorrow AM   - Routine Wound Care   - Potential discharge in 1-2 days     Osteoporosis  - Last DEXA Scan in 2022 with severe osteoporosis  - Takes Fosamax weekly in Vitamin D supplement   - Hold for now, continue outpatient.     Coccyx Wound   - Wound care following with dressing changes twice daily.   - Their recommendations appreciated.     Chronic Normocytic Anemia  - Likely 2/2 to Anemia of Chronic Disease at this time. Given no s/s of acute blood loss prior to surgery.   - Will need to follow with daily CBC post-op to ensure it remains above 7 and patient is asymptomatic   - Transfuse if hemoglobin less than 7. Follow protocol if indicated.     Protein/Calorie Malnutrition   - Dietitian consulted overnight. Their

## 2024-05-08 NOTE — PROGRESS NOTES
Pt NPO since 0000 in preparation for upcoming procedure. Pain managed throughout the night with IV pain medications. Pt prepared for surgery at this time, complete with turner care/linen/gown changed.

## 2024-05-08 NOTE — PROGRESS NOTES
Received pt from IP room to [facility] room 8 to prep for surgery. Consents reviewed. VSS Nozin applied to both nares. Mepilex already in place. IS instructed

## 2024-05-08 NOTE — PROGRESS NOTES
4 Eyes Skin Assessment     NAME:  Rubina Werner  YOB: 1945  MEDICAL RECORD NUMBER:  [id number]    The patient is being assessed for  Post-Op Surgical    I agree that at least one RN has performed a thorough Head to Toe Skin Assessment on the patient. ALL assessment sites listed below have been assessed.  I did not remove surgical dressing to assess.  The deep purple pressure area was documented on admission.     Areas assessed by both nurses:    Head, Face, Ears, Shoulders, Back, Chest, Arms, Elbows, Hands, Sacrum. Buttock, Coccyx, Ischium, Legs. Feet and Heels, and Under Medical Devices         Does the Patient have a Wound? Yes wound(s) were present on assessment. LDA wound assessment was Initiated and completed by RN       Natan Prevention initiated by RN: Yes  Wound Care Orders initiated by RN: Yes    Pressure Injury (Stage 3,4, Unstageable, DTI, NWPT, and Complex wounds) if present, place Wound referral order by RN under : Yes    New Ostomies, if present place, Ostomy referral order under : No     Nurse 1 eSignature: Electronically signed by Shira Ignacio RN on 5/8/24 at 4:23 PM EDT    **SHARE this note so that the co-signing nurse can place an eSignature**    Nurse 2 eSignature: Electronically signed by Melinda Dennis RN on 5/8/24 at 9:36 PM EDT

## 2024-05-08 NOTE — ANESTHESIA POSTPROCEDURE EVALUATION
Department of Anesthesiology  Postprocedure Note    Patient: Rubina Werner  MRN: [id number]  YOB: 1945  Date of evaluation: 5/8/2024    Procedure Summary       Date: 05/08/24 Room / Location: [address]    Anesthesia Start: 0741 Anesthesia Stop: 0901    Procedure: HIP INTRAMEDULLARY NAIL ELLYN INSERTION (Left) Diagnosis:       Intertrochanteric fracture of left femur, closed, initial encounter ([facility])      (Intertrochanteric fracture of left femur, closed, initial encounter ([facility]) [S72.142A])    Surgeons: Dennis Phillips MD Responsible Provider: Eloy Porter MD    Anesthesia Type: general ASA Status: 2            Anesthesia Type: No value filed.    Martín Phase I: Martín Score: 10    Martín Phase II:      Anesthesia Post Evaluation    Patient location during evaluation: PACU  Patient participation: complete - patient participated  Level of consciousness: awake and alert  Airway patency: patent  Nausea & Vomiting: no nausea and no vomiting  Cardiovascular status: blood pressure returned to baseline  Respiratory status: acceptable  Hydration status: euvolemic  Comments: VSS on transfer to phase 2 recovery.  No anesthetic complications.  Pain management: adequate    No notable events documented.

## 2024-05-08 NOTE — INTERVAL H&P NOTE
Update History & Physical    The patient's History and Physical of May 7, 2024 was reviewed with the patient and I examined the patient. There was no change. The surgical site was confirmed by the patient and me.     Plan: The risks, benefits, expected outcome, and alternative to the recommended procedure have been discussed with the patient. Patient understands and wants to proceed with the procedure.     Electronically signed by Dennis Phillips MD on 5/8/2024 at 7:10 AM

## 2024-05-08 NOTE — PROGRESS NOTES
Physician Progress Note      PATIENT:               HUSSEIN MEZA  CSN #:                  [id number]  :                       1945  ADMIT DATE:       2024 5:50 PM  DISCH DATE:  RESPONDING  PROVIDER #:        Marin Sheldon MD          QUERY TEXT:    Patient admitted with left hip fracture.   Noted documentation of severe   malnutrition on H/P and moderate malnutrition per RD consult .  If possible, please document in progress notes and discharge summary if you   are evaluating and /or treating any of the following:    The medical record reflects the following:  Risk Factors: 78 yr old female, osteoporosis,  Clinical Indicators: Per H/P -\"Severe Protein -Calorie Malnutrition   -Patient appears Cachectic with BMI    17-dietitian consult\"  Per RD consult -\"Moderate malnutrition related to inadequate protein-energy   intake as evidenced by poor intake prior to admission, BMI, Criteria as   identified in malnutrition assessment, mild loss of subcutaneous fat, moderate   muscle loss\"  BMI 17  Treatment: RD consult, ONS, weight, Vitamin D/Fosamax, labs, imaging    Thank you,  Shahida Deleon RN BSN CRCR CCDS  [email or web address]  Options provided:  -- Severe malnutrition confirmed, please include additional supporting   criteria, please include additional supporting criteria.  -- Moderate malnutrition confirmed, severe malnutrition ruled out  -- Other - I will add my own diagnosis  -- Disagree - Not applicable / Not valid  -- Disagree - Clinically unable to determine / Unknown  -- Refer to Clinical Documentation Reviewer    PROVIDER RESPONSE TEXT:    Moderate malnutrition confirmed, severe malnutrition ruled out.    Query created by: Nano Deleon on 2024 11:48 AM      Electronically signed by:  Marin Sheldon MD 2024 12:06 PM

## 2024-05-09 LAB
ANION GAP SERPL CALCULATED.3IONS-SCNC: 11 MMOL/L (ref 3–16)
BUN SERPL-MCNC: 19 MG/DL (ref 7–20)
CALCIUM SERPL-MCNC: 8.5 MG/DL (ref 8.3–10.6)
CHLORIDE SERPL-SCNC: 100 MMOL/L (ref 99–110)
CO2 SERPL-SCNC: 25 MMOL/L (ref 21–32)
CREAT SERPL-MCNC: <0.5 MG/DL (ref 0.6–1.2)
DEPRECATED RDW RBC AUTO: 13.2 % (ref 12.4–15.4)
GFR SERPLBLD CREATININE-BSD FMLA CKD-EPI: >90 ML/MIN/{1.73_M2}
GLUCOSE SERPL-MCNC: 141 MG/DL (ref 70–99)
HCT VFR BLD AUTO: 27.8 % (ref 36–48)
HGB BLD-MCNC: 9.4 G/DL (ref 12–16)
MCH RBC QN AUTO: 31.5 PG (ref 26–34)
MCHC RBC AUTO-ENTMCNC: 33.8 G/DL (ref 31–36)
MCV RBC AUTO: 93.1 FL (ref 80–100)
PLATELET # BLD AUTO: 330 K/UL (ref 135–450)
PMV BLD AUTO: 7.4 FL (ref 5–10.5)
POTASSIUM SERPL-SCNC: 3.7 MMOL/L (ref 3.5–5.1)
RBC # BLD AUTO: 2.99 M/UL (ref 4–5.2)
SODIUM SERPL-SCNC: 136 MMOL/L (ref 136–145)
WBC # BLD AUTO: 8.4 K/UL (ref 4–11)

## 2024-05-09 PROCEDURE — 97535 SELF CARE MNGMENT TRAINING: CPT

## 2024-05-09 PROCEDURE — 80048 BASIC METABOLIC PNL TOTAL CA: CPT

## 2024-05-09 PROCEDURE — 6360000002 HC RX W HCPCS: Performed by: ORTHOPAEDIC SURGERY

## 2024-05-09 PROCEDURE — 99024 POSTOP FOLLOW-UP VISIT: CPT | Performed by: SPECIALIST/TECHNOLOGIST

## 2024-05-09 PROCEDURE — APPNB45 APP NON BILLABLE 31-45 MINUTES: Performed by: SPECIALIST/TECHNOLOGIST

## 2024-05-09 PROCEDURE — 97530 THERAPEUTIC ACTIVITIES: CPT

## 2024-05-09 PROCEDURE — 2580000003 HC RX 258: Performed by: STUDENT IN AN ORGANIZED HEALTH CARE EDUCATION/TRAINING PROGRAM

## 2024-05-09 PROCEDURE — 1200000000 HC SEMI PRIVATE

## 2024-05-09 PROCEDURE — 85027 COMPLETE CBC AUTOMATED: CPT

## 2024-05-09 PROCEDURE — 2580000003 HC RX 258: Performed by: ORTHOPAEDIC SURGERY

## 2024-05-09 PROCEDURE — 6370000000 HC RX 637 (ALT 250 FOR IP)

## 2024-05-09 PROCEDURE — 6370000000 HC RX 637 (ALT 250 FOR IP): Performed by: SPECIALIST/TECHNOLOGIST

## 2024-05-09 PROCEDURE — 97162 PT EVAL MOD COMPLEX 30 MIN: CPT

## 2024-05-09 PROCEDURE — 97166 OT EVAL MOD COMPLEX 45 MIN: CPT

## 2024-05-09 RX ORDER — MECOBALAMIN 5000 MCG
5 TABLET,DISINTEGRATING ORAL NIGHTLY
Status: DISCONTINUED | OUTPATIENT
Start: 2024-05-09 | End: 2024-05-10 | Stop reason: HOSPADM

## 2024-05-09 RX ORDER — 0.9 % SODIUM CHLORIDE 0.9 %
500 INTRAVENOUS SOLUTION INTRAVENOUS ONCE
Status: COMPLETED | OUTPATIENT
Start: 2024-05-09 | End: 2024-05-09

## 2024-05-09 RX ORDER — MECOBALAMIN 5000 MCG
5 TABLET,DISINTEGRATING ORAL NIGHTLY PRN
Status: DISCONTINUED | OUTPATIENT
Start: 2024-05-09 | End: 2024-05-09

## 2024-05-09 RX ADMIN — SODIUM CHLORIDE 500 ML: 9 INJECTION, SOLUTION INTRAVENOUS at 10:02

## 2024-05-09 RX ADMIN — CASTOR OIL AND BALSAM, PERU: 788; 87 OINTMENT TOPICAL at 20:37

## 2024-05-09 RX ADMIN — ACETAMINOPHEN 650 MG: 325 TABLET ORAL at 18:29

## 2024-05-09 RX ADMIN — ENOXAPARIN SODIUM 30 MG: 100 INJECTION SUBCUTANEOUS at 09:01

## 2024-05-09 RX ADMIN — POLYETHYLENE GLYCOL 3350 17 G: 17 POWDER, FOR SOLUTION ORAL at 09:01

## 2024-05-09 RX ADMIN — CEFAZOLIN 1000 MG: 1 INJECTION, POWDER, FOR SOLUTION INTRAMUSCULAR; INTRAVENOUS at 00:07

## 2024-05-09 RX ADMIN — Medication 5 MG: at 20:37

## 2024-05-09 RX ADMIN — SODIUM CHLORIDE, PRESERVATIVE FREE 10 ML: 5 INJECTION INTRAVENOUS at 20:37

## 2024-05-09 RX ADMIN — SODIUM CHLORIDE, PRESERVATIVE FREE 10 ML: 5 INJECTION INTRAVENOUS at 09:01

## 2024-05-09 RX ADMIN — ACETAMINOPHEN 650 MG: 325 TABLET ORAL at 12:08

## 2024-05-09 RX ADMIN — CASTOR OIL AND BALSAM, PERU: 788; 87 OINTMENT TOPICAL at 09:02

## 2024-05-09 RX ADMIN — SODIUM CHLORIDE, PRESERVATIVE FREE 10 ML: 5 INJECTION INTRAVENOUS at 00:07

## 2024-05-09 RX ADMIN — OXYCODONE HYDROCHLORIDE 2.5 MG: 5 TABLET ORAL at 12:11

## 2024-05-09 ASSESSMENT — PAIN SCALES - GENERAL
PAINLEVEL_OUTOF10: 0
PAINLEVEL_OUTOF10: 6

## 2024-05-09 ASSESSMENT — PAIN - FUNCTIONAL ASSESSMENT: PAIN_FUNCTIONAL_ASSESSMENT: ACTIVITIES ARE NOT PREVENTED

## 2024-05-09 ASSESSMENT — PAIN DESCRIPTION - DESCRIPTORS: DESCRIPTORS: ACHING

## 2024-05-09 ASSESSMENT — PAIN DESCRIPTION - ORIENTATION: ORIENTATION: LEFT

## 2024-05-09 ASSESSMENT — PAIN DESCRIPTION - LOCATION: LOCATION: HIP

## 2024-05-09 NOTE — PROGRESS NOTES
Physical Therapy  Facility/Department: [place] [id number] - MED SURG/ORTHO  Physical Therapy Initial Assessment/Treatment    Name: Rubina Werner  : 1945  MRN: [id number]  Date of Service: 2024    Discharge Recommendations:  Subacute/Skilled Nursing Facility   PT Equipment Recommendations  Other: Defer to next level of care      Therapy discharge recommendations take into account each patient's current medical complexities and are subject to input/oversight from the patient's healthcare team.   Barriers to Home Discharge:   [x] Steps to access home entry or bed/bath:   [x] Unable to transfer, ambulate, or propel wheelchair household distances without assist   [x] Limited available assist at home upon discharge    [] Patient or family requests d/c to post-acute facility    [] Poor cognition/safety awareness for d/c to home alone    [] Unable to maintain ordered weight bearing status    [] Patient with salient signs of long-standing immobility   [] Other:    If pt is unable to be seen after this session, please let this note serve as discharge summary.  Please see case management note for discharge disposition.  Thank you.    Patient Diagnosis(es): The primary encounter diagnosis was Fall, initial encounter. Diagnoses of Closed fracture of left hip, initial encounter (HCC) and Intertrochanteric fracture of left femur, closed, initial encounter (HCC) were also pertinent to this visit.  Past Medical History:  has a past medical history of Hyperlipidemia, Influenza A, Kidney stones, and Vision impairment.  Past Surgical History:  has a past surgical history that includes eye surgery; pituitary surgery; Cystoscopy; Breast surgery; Appendectomy; and Cataract removal (left eye    2010).    Assessment   Body Structures, Functions, Activity Limitations Requiring Skilled Therapeutic Intervention: Decreased functional mobility ;Decreased endurance;Decreased ROM;Increased pain;Decreased balance;Decreased posture;Decreased  much help is needed walking in hospital room?: Total  How much help is needed climbing 3-5 steps with a railing?: Total  AM-PAC Inpatient Mobility Raw Score : 8  AM-PAC Inpatient T-Scale Score : 28.52  Mobility Inpatient CMS 0-100% Score: 86.62  Mobility Inpatient CMS G-Code Modifier : CM       Goals  Short Term Goals  Time Frame for Short Term Goals: 1 week 5/15/24 (unless otherwise specified\"  Short Term Goal 1: Pt will complete bed mobility with Marek  Short Term Goal 2: Pt will complete functional t/f with LRAD with modA  Short Term Goal 3: Pt will ambulate 10' with LRAD with modA without LOB  Short Term Goal 4: 5/12/24: Pt will participate in 12-15 reps BLE TE to improve strength and increase safety and IND with mobility and gait  Patient Goals   Patient Goals : \"Go home\"       Education  Patient Education  Education Given To: Patient  Education Provided: Role of Therapy;Plan of Care;Precautions;Fall Prevention Strategies  Education Provided Comments: Educated on role of PT, goals, importance of OOB mobility, healing and current WBAT.  Education Method: Demonstration;Verbal  Education Outcome: Verbalized understanding;Continued education needed      Therapy Time   Individual Concurrent Group Co-treatment   Time In 0857         Time Out 0932         Minutes 35         Timed Code Treatment Minutes: 23 Minutes (13 minutes for eval)       Betsy Fabian, PT, DPT

## 2024-05-09 NOTE — PROGRESS NOTES
V2.0    [facility] Progress Note      Name:  Rubina Werner /Age/Sex: 1945  (78 y.o. female)   MRN & CSN:  [id number] & [id number] Encounter Date/Time: 2024 10:20 AM EDT   Location:   PCP: Axel Magallon MD     Attending:Marin Sheldon MD       Hospital Day: 4    Assessment and Recommendations   Rubina Werner is a 78 y.o. female with pmh of osteoporosis who presents with Closed left hip fracture (HCC)      Update: Patient seen at bedside this AM without family present. Refused morning labs, but was agreeable when I was in the room with her and nurse. Has some underlying confusion or delirium overnight which continues to be present this AM likely given her lack of sleep.  is looking at potential SNF options today however patient still prefers to go home.       Plan:   Left Intra-Trochanteric Hip Fracture   - Radiographic and CT findings in Subjective as below.   - Patient completed IM Nail with Dr. Dennis Phillips on 24   - Patient now contemplative of SNF vs. [facility] with [facility]  - Ortho following. Their recommendations appreciated:    - WBAT to LLE. Walker to assist.    - PT/OT post-op   - PRN Pain medications    - IV Ancef 24 hours post-op   - Lovenox for DVT prophylaxis with SCDs   - Routine Wound Care   - Potential discharge tomorrow     Osteoporosis  - Last DEXA Scan in 2022 with severe osteoporosis  - Takes Fosamax weekly in Vitamin D supplement   - Hold for now, continue outpatient.     Coccyx Wound   - Wound care following with dressing changes twice daily.   - Their recommendations appreciated.     Chronic Normocytic Anemia  - Likely 2/2 to Anemia of Chronic Disease at this time vs. Acute Blood Loss. Given no s/s of acute blood loss post-op.   - Will need to follow with daily CBC post-op to ensure it remains above 7 and patient is asymptomatic   - Transfuse if hemoglobin less than 7. Follow protocol if indicated.     Protein/Calorie Malnutrition   - Dietitian consulted

## 2024-05-09 NOTE — PLAN OF CARE
Problem: Discharge Planning  Goal: Discharge to home or other facility with appropriate resources  5/8/2024 2135 by Melinda Dennis RN  Outcome: Progressing  5/8/2024 1734 by Shira Ignacio RN  Outcome: Progressing     Problem: Pain  Goal: Verbalizes/displays adequate comfort level or baseline comfort level  5/8/2024 2135 by Melinda Dennis RN  Outcome: Progressing  5/8/2024 1734 by Shira Ignacio RN  Outcome: Progressing     Problem: Skin/Tissue Integrity  Goal: Absence of new skin breakdown  Description: 1.  Monitor for areas of redness and/or skin breakdown  2.  Assess vascular access sites hourly  3.  Every 4-6 hours minimum:  Change oxygen saturation probe site  4.  Every 4-6 hours:  If on nasal continuous positive airway pressure, respiratory therapy assess nares and determine need for appliance change or resting period.  5/8/2024 2135 by Melinda Dennis RN  Outcome: Progressing  5/8/2024 1734 by Shira Ignacio RN  Outcome: Progressing     Problem: Safety - Adult  Goal: Free from fall injury  5/8/2024 2135 by Melinda Dennis RN  Outcome: Progressing  5/8/2024 1734 by Shira Ignacio RN  Outcome: Progressing     Problem: ABCDS Injury Assessment  Goal: Absence of physical injury  5/8/2024 2135 by Melinda Dennis RN  Outcome: Progressing  5/8/2024 1734 by Shira Ignacio RN  Outcome: Progressing     Problem: Confusion  Goal: Confusion, delirium, dementia, or psychosis is improved or at baseline  Description: INTERVENTIONS:  1. Assess for possible contributors to thought disturbance, including medications, impaired vision or hearing, underlying metabolic abnormalities, dehydration, psychiatric diagnoses, and notify attending LIP  2. [place] high risk fall precautions, as indicated  3. Provide frequent short contacts to provide reality reorientation, refocusing and direction  4. Decrease environmental stimuli, including noise as appropriate  5. Monitor and intervene to maintain adequate  nutrition, hydration, elimination, sleep and activity  6. If unable to ensure safety without constant attention obtain sitter and review sitter guidelines with assigned personnel  7. Initiate Psychosocial CNS and Spiritual Care consult, as indicated  5/8/2024 2135 by Melinda Dennis RN  Outcome: Progressing  5/8/2024 1734 by Shira Ignacio RN  Outcome: Progressing     Problem: Nutrition Deficit:  Goal: Optimize nutritional status  5/8/2024 2135 by Melinda Dennis RN  Outcome: Progressing  5/8/2024 1734 by Shira Ignacio RN  Outcome: Progressing

## 2024-05-09 NOTE — PROGRESS NOTES
Occupational Therapy  Facility/Department: [place] [id number] - MED SURG/ORTHO  Occupational Therapy Initial Assessment & Treatment Note    Name: Rubina Werner  : 1945  MRN: [id number]  Date of Service: 2024    Discharge Recommendations:  Subacute/Skilled Nursing Facility  OT Equipment Recommendations  Equipment Needed: No (defer)     Therapy discharge recommendations are subject to collaboration from the patient’s interdisciplinary healthcare team, including MD and case management recommendations.    Barriers to Home Discharge:   [x] Steps to access home entry or bed/bath:   [x] Unable to transfer, ambulate, or propel wheelchair household distances without assist   [x] Limited available assist at home upon discharge    [] Patient or family requests d/c to post-acute facility    [] Poor cognition/safety awareness for d/c to home alone    [] Unable to maintain ordered weight bearing status    [] Patient with salient signs of long-standing immobility   [x] Decreased independence with ADLs   [x] Increased risk for falls   [] Other:    If pt is unable to be seen after this session, please let this note serve as discharge summary.  Please see case management note for discharge disposition.  Thank you.    Patient Diagnosis(es): The primary encounter diagnosis was Fall, initial encounter. Diagnoses of Closed fracture of left hip, initial encounter (HCC) and Intertrochanteric fracture of left femur, closed, initial encounter (HCC) were also pertinent to this visit.  Past Medical History:  has a past medical history of Hyperlipidemia, Influenza A, Kidney stones, and Vision impairment.  Past Surgical History:  has a past surgical history that includes eye surgery; pituitary surgery; Cystoscopy; Breast surgery; Appendectomy; and Cataract removal (left eye    2010).    Assessment   Performance deficits / Impairments: Decreased ADL status;Decreased functional mobility ;Decreased endurance;Decreased balance;Decreased  ROM;Decreased strength;Decreased safe awareness  Assessment: 79 y/o female presents to [facility] POD #1 L HIP CEPHALOMEDULLARY NAILING and is LLE WBAT. Pt reports living in multi-level home w/ 3 NBAIL to enter and endorses MOD IND all ADLs and functional transfers/mobility w/ RW, pt daughter assists w/ IADLs. Pt currently requiring mod x2 for bed mobility, total A LB ADLs, & min A overall for balance seated EOB w/ persistent R lateral lean 2/2 kyphotic posture. Pt declining additional OOB mobility / STS trials this date 2/2 pain requesting to return to bed at EOS. Pt is functioning below baseline and would benefit from skilled OT services to improve functional IND and safety. OT rec d/c SNF, cont acute OT.   Prognosis: Fair  Decision Making: Medium Complexity  REQUIRES OT FOLLOW-UP: Yes  Activity Tolerance  Activity Tolerance: Patient Tolerated treatment well;Patient limited by fatigue;Patient limited by pain        Plan   Occupational Therapy Plan  Times Per Week: x3-5/ week  Current Treatment Recommendations: Strengthening, Balance training, Pain management, Safety education & training, Self-Care / ADL, Functional mobility training, Endurance training, Patient/Caregiver education & training, ROM, Positioning, Co-Treatment     Restrictions  Restrictions/Precautions  Restrictions/Precautions: Fall Risk, General Precautions, Weight Bearing  Lower Extremity Weight Bearing Restrictions  Right Lower Extremity Weight Bearing: Weight Bearing As Tolerated  Position Activity Restriction  Other position/activity restrictions: IV, tele, purewick, s/p L hip nailing (WBAT)    Subjective   General  Patient assessed for rehabilitation services?: Yes  Subjective  Subjective: pt supine in bed on approach, pleasant and agreeable to session. RN approved.  denies pain at rest, reports inc L hip pain w/ mobility but unable to rate    Social/Functional History  Social/Functional History  Lives With: Spouse  Type of Home: House  Home Layout:

## 2024-05-09 NOTE — PLAN OF CARE
Problem: Discharge Planning  Goal: Discharge to home or other facility with appropriate resources  5/9/2024 1029 by Kaveh Olivo LPN  Outcome: Progressing  5/8/2024 2135 by Melinda Dennis RN  Outcome: Progressing     Problem: Pain  Goal: Verbalizes/displays adequate comfort level or baseline comfort level  5/9/2024 1029 by Kaveh Olivo LPN  Outcome: Progressing  5/8/2024 2135 by Melinda Dennis RN  Outcome: Progressing     Problem: Skin/Tissue Integrity  Goal: Absence of new skin breakdown  Description: 1.  Monitor for areas of redness and/or skin breakdown  2.  Assess vascular access sites hourly  3.  Every 4-6 hours minimum:  Change oxygen saturation probe site  4.  Every 4-6 hours:  If on nasal continuous positive airway pressure, respiratory therapy assess nares and determine need for appliance change or resting period.  5/9/2024 1029 by Kaveh Olivo LPN  Outcome: Progressing  5/8/2024 2135 by Melinda Dennis RN  Outcome: Progressing     Problem: Safety - Adult  Goal: Free from fall injury  5/9/2024 1029 by Kaveh Olivo LPN  Outcome: Progressing  5/8/2024 2135 by Melinda Dennis RN  Outcome: Progressing     Problem: ABCDS Injury Assessment  Goal: Absence of physical injury  5/9/2024 1029 by Kaveh Oliov LPN  Outcome: Progressing  5/8/2024 2135 by Melinda Dennis RN  Outcome: Progressing

## 2024-05-09 NOTE — PROGRESS NOTES
When I went to do neuro checks as well as vitals pt was sleeping. When I woke her up she became aggressive towards writer. I called in Charge Nurse. Who helped me be able to complete task. I did attempt to call 2 of patient's daughters in hopes they could talk to their mother and redirect her.     Pt did use phone to call 911, Charge Nurse spoke to security and explained the situation.       Pt's 0000 antibiotic was hung with the help of 2 other nurses.     Will continue to monitor the situation.

## 2024-05-09 NOTE — PROGRESS NOTES
Department of Orthopedic Surgery  Physician Assistant   Progress Note    Subjective:       Systemic or Specific Complaints:Some pain in the left leg, was able to work with therapy but did not walk. Tolerating PO, voiding. No family at bedside    Objective:     Patient Vitals for the past 24 hrs:   BP Temp Temp src Pulse Resp SpO2   05/09/24 0858 (!) 95/55 98.4 °F (36.9 °C) Oral 93 18 95 %   05/09/24 0330 (!) 97/57 97.5 °F (36.4 °C) Oral 84 20 97 %   05/08/24 2330 114/64 97.3 °F (36.3 °C) Axillary 64 22 --   05/08/24 1930 100/60 97.4 °F (36.3 °C) Oral 88 18 97 %   05/08/24 1510 103/66 97.3 °F (36.3 °C) Oral 81 16 96 %   05/08/24 1445 102/62 97.7 °F (36.5 °C) -- 82 16 --   05/08/24 1303 (!) 89/55 -- -- 89 16 --   05/08/24 1152 -- -- -- -- -- 98 %   05/08/24 1149 (!) 92/55 97.3 °F (36.3 °C) Oral 93 16 (!) 89 %   05/08/24 1030 112/67 97.5 °F (36.4 °C) Oral 91 16 93 %       General: alert, appears stated age, cooperative, and no distress   Wound: Wound clean and dry no evidence of infection., No Erythema, No Drainage, and Positive for Edema   Motion: Painful range of Motion in affected extremity   DVT Exam: No evidence of DVT seen on physical exam.  No cords or calf tenderness.  No significant calf/ankle edema.     Additional exam: Patient seen sitting up in bed at time of interview  Leg lengths equal, rotational alignment neutral  Thigh moderate swelling as expected, compartments compressible  EHL, FHL, gastroc, and anterior tibialis motor intact  Sensation intact to light touch  DP and PT pulses 2+      Data Review  CBC:   Lab Results   Component Value Date/Time    WBC 5.9 05/08/2024 05:47 AM    RBC 2.93 05/08/2024 05:47 AM    HGB 9.0 05/08/2024 05:47 AM    HCT 27.6 05/08/2024 05:47 AM     05/08/2024 05:47 AM       Renal:   Lab Results   Component Value Date/Time     05/08/2024 05:47 AM    K 3.5 05/08/2024 05:47 AM    K 4.0 05/07/2024 05:00 AM     05/08/2024 05:47 AM    CO2 24 05/08/2024 05:47 AM

## 2024-05-09 NOTE — PROGRESS NOTES
Physician Progress Note      PATIENT:               HUSSEIN MEZA  CSN #:                  [id number]  :                       1945  ADMIT DATE:       2024 5:50 PM  DISCH DATE:  RESPONDING  PROVIDER #:        Marin Sheldon MD          QUERY TEXT:    Pt admitted with left hip fracture. Pt noted to have osteoporosis. If   possible, please document in progress notes and discharge summary if you are   evaluating and/or treating any of the following:    The medical record reflects the following:  Risk Factors: 78 yr old female, osteoporosis, left hip fx    Clinical Indicators: per IM/resident note -\"Osteoporosis- Last DEXA Scan in   2022 with severe osteoporosis- Takes Fosamax weekly in Vitamin D   supplement\"    Treatment: Imaging, ortho consult, IM nailing, supportive care    Thank you,  Shahida Deleon RN BSN CRCR CCDS  [email or web address]  Options provided:  -- Osteoporotic left hip fracture following fall which would not usually break   a normal, healthy bone  -- Other - I will add my own diagnosis  -- Disagree - Not applicable / Not valid  -- Disagree - Clinically unable to determine / Unknown  -- Refer to Clinical Documentation Reviewer    PROVIDER RESPONSE TEXT:    This patient has an osteoporotic left hip fracture following fall which would   not break a normal, healthy bone.    Query created by: Nano Deleon on 2024 12:17 PM      Electronically signed by:  Marin Sheldon MD 2024 2:12 PM

## 2024-05-10 VITALS
HEIGHT: 63 IN | HEART RATE: 95 BPM | DIASTOLIC BLOOD PRESSURE: 69 MMHG | WEIGHT: 98 LBS | OXYGEN SATURATION: 94 % | TEMPERATURE: 98.3 F | BODY MASS INDEX: 17.36 KG/M2 | RESPIRATION RATE: 18 BRPM | SYSTOLIC BLOOD PRESSURE: 116 MMHG

## 2024-05-10 LAB
ANION GAP SERPL CALCULATED.3IONS-SCNC: 10 MMOL/L (ref 3–16)
BUN SERPL-MCNC: 19 MG/DL (ref 7–20)
CALCIUM SERPL-MCNC: 8.5 MG/DL (ref 8.3–10.6)
CHLORIDE SERPL-SCNC: 100 MMOL/L (ref 99–110)
CO2 SERPL-SCNC: 27 MMOL/L (ref 21–32)
CREAT SERPL-MCNC: <0.5 MG/DL (ref 0.6–1.2)
DEPRECATED RDW RBC AUTO: 13.6 % (ref 12.4–15.4)
GFR SERPLBLD CREATININE-BSD FMLA CKD-EPI: >90 ML/MIN/{1.73_M2}
GLUCOSE SERPL-MCNC: 96 MG/DL (ref 70–99)
HCT VFR BLD AUTO: 27.8 % (ref 36–48)
HGB BLD-MCNC: 9.4 G/DL (ref 12–16)
MCH RBC QN AUTO: 31.1 PG (ref 26–34)
MCHC RBC AUTO-ENTMCNC: 33.7 G/DL (ref 31–36)
MCV RBC AUTO: 92.5 FL (ref 80–100)
PLATELET # BLD AUTO: 333 K/UL (ref 135–450)
PMV BLD AUTO: 7.3 FL (ref 5–10.5)
POTASSIUM SERPL-SCNC: 3.3 MMOL/L (ref 3.5–5.1)
RBC # BLD AUTO: 3 M/UL (ref 4–5.2)
SODIUM SERPL-SCNC: 137 MMOL/L (ref 136–145)
WBC # BLD AUTO: 6.7 K/UL (ref 4–11)

## 2024-05-10 PROCEDURE — 85027 COMPLETE CBC AUTOMATED: CPT

## 2024-05-10 PROCEDURE — 97110 THERAPEUTIC EXERCISES: CPT

## 2024-05-10 PROCEDURE — 6370000000 HC RX 637 (ALT 250 FOR IP): Performed by: STUDENT IN AN ORGANIZED HEALTH CARE EDUCATION/TRAINING PROGRAM

## 2024-05-10 PROCEDURE — 97530 THERAPEUTIC ACTIVITIES: CPT

## 2024-05-10 PROCEDURE — 2580000003 HC RX 258: Performed by: ORTHOPAEDIC SURGERY

## 2024-05-10 PROCEDURE — 6360000002 HC RX W HCPCS: Performed by: ORTHOPAEDIC SURGERY

## 2024-05-10 PROCEDURE — 97535 SELF CARE MNGMENT TRAINING: CPT

## 2024-05-10 PROCEDURE — 6370000000 HC RX 637 (ALT 250 FOR IP): Performed by: SPECIALIST/TECHNOLOGIST

## 2024-05-10 PROCEDURE — 80048 BASIC METABOLIC PNL TOTAL CA: CPT

## 2024-05-10 PROCEDURE — 99024 POSTOP FOLLOW-UP VISIT: CPT | Performed by: PHYSICIAN ASSISTANT

## 2024-05-10 RX ORDER — POTASSIUM CHLORIDE 20 MEQ/1
20 TABLET, EXTENDED RELEASE ORAL ONCE
Status: COMPLETED | OUTPATIENT
Start: 2024-05-10 | End: 2024-05-10

## 2024-05-10 RX ADMIN — ENOXAPARIN SODIUM 30 MG: 100 INJECTION SUBCUTANEOUS at 08:38

## 2024-05-10 RX ADMIN — POTASSIUM CHLORIDE 20 MEQ: 1500 TABLET, EXTENDED RELEASE ORAL at 10:48

## 2024-05-10 RX ADMIN — CASTOR OIL AND BALSAM, PERU: 788; 87 OINTMENT TOPICAL at 08:39

## 2024-05-10 RX ADMIN — ACETAMINOPHEN 650 MG: 325 TABLET ORAL at 10:48

## 2024-05-10 RX ADMIN — POLYETHYLENE GLYCOL 3350 17 G: 17 POWDER, FOR SOLUTION ORAL at 08:38

## 2024-05-10 RX ADMIN — SODIUM CHLORIDE, PRESERVATIVE FREE 10 ML: 5 INJECTION INTRAVENOUS at 08:38

## 2024-05-10 RX ADMIN — OXYCODONE 5 MG: 5 TABLET ORAL at 08:38

## 2024-05-10 ASSESSMENT — PAIN SCALES - GENERAL
PAINLEVEL_OUTOF10: 7
PAINLEVEL_OUTOF10: 2

## 2024-05-10 NOTE — PROGRESS NOTES
Department of Orthopedic Surgery  Physician Assistant   Progress Note    Subjective:     Post op day 2 Left hip IM Nail   Systemic or Specific Complaints:Some pain in the left leg, was able to work with therapy but did not walk. Tolerating PO, voiding. No family at bedside therpay still recommending SNF     Objective:     Patient Vitals for the past 24 hrs:   BP Temp Temp src Pulse Resp SpO2   05/10/24 0815 116/69 98.3 °F (36.8 °C) Oral 95 18 94 %   05/10/24 0809 116/69 -- -- 83 -- 98 %   05/09/24 1927 109/70 98 °F (36.7 °C) Oral 87 14 93 %   05/09/24 1707 118/70 -- -- 86 18 95 %   05/09/24 1602 101/68 97.8 °F (36.6 °C) Oral 80 -- 97 %   05/09/24 1403 99/62 97.7 °F (36.5 °C) Oral 79 18 97 %   05/09/24 1241 -- -- -- -- 18 --   05/09/24 1211 123/60 -- -- -- 18 --         General: alert, appears stated age, cooperative, and no distress   Wound: Wound clean and dry no evidence of infection., No Erythema, No Drainage, and Positive for Edema   Motion: Painful range of Motion in affected extremity   DVT Exam: No evidence of DVT seen on physical exam.  No cords or calf tenderness.  No significant calf/ankle edema.     Additional exam: Patient seen sitting up in bed at time of interview  Leg lengths equal, rotational alignment neutral  Thigh moderate swelling as expected, compartments compressible  EHL, FHL, gastroc, and anterior tibialis motor intact  Sensation intact to light touch  DP and PT pulses 2+      Data Review  CBC:   Lab Results   Component Value Date/Time    WBC 6.7 05/10/2024 05:40 AM    RBC 3.00 05/10/2024 05:40 AM    HGB 9.4 05/10/2024 05:40 AM    HCT 27.8 05/10/2024 05:40 AM     05/10/2024 05:40 AM       Renal:   Lab Results   Component Value Date/Time     05/10/2024 05:40 AM    K 3.3 05/10/2024 05:40 AM    K 4.0 05/07/2024 05:00 AM     05/10/2024 05:40 AM    CO2 27 05/10/2024 05:40 AM    BUN 19 05/10/2024 05:40 AM    CREATININE <0.5 05/10/2024 05:40 AM    GLUCOSE 96 05/10/2024 05:40 AM

## 2024-05-10 NOTE — PLAN OF CARE
Problem: Discharge Planning  Goal: Discharge to home or other facility with appropriate resources  5/9/2024 2205 by Melinda Dennis RN  Outcome: Progressing  5/9/2024 1029 by Kaveh Olivo LPN  Outcome: Progressing     Problem: Pain  Goal: Verbalizes/displays adequate comfort level or baseline comfort level  5/9/2024 2205 by Melinda Dennis RN  Outcome: Progressing  5/9/2024 1029 by Kaveh Olivo LPN  Outcome: Progressing     Problem: Skin/Tissue Integrity  Goal: Absence of new skin breakdown  Description: 1.  Monitor for areas of redness and/or skin breakdown  2.  Assess vascular access sites hourly  3.  Every 4-6 hours minimum:  Change oxygen saturation probe site  4.  Every 4-6 hours:  If on nasal continuous positive airway pressure, respiratory therapy assess nares and determine need for appliance change or resting period.  5/9/2024 2205 by Melinda Dennis RN  Outcome: Progressing  5/9/2024 1029 by Kaveh Olivo LPN  Outcome: Progressing     Problem: Safety - Adult  Goal: Free from fall injury  5/9/2024 2205 by Melinda Dennis RN  Outcome: Progressing  5/9/2024 1029 by Kaveh Olivo LPN  Outcome: Progressing     Problem: ABCDS Injury Assessment  Goal: Absence of physical injury  5/9/2024 2205 by Melinda Dennis RN  Outcome: Progressing  5/9/2024 1029 by Kaveh Olivo LPN  Outcome: Progressing     Problem: Confusion  Goal: Confusion, delirium, dementia, or psychosis is improved or at baseline  Description: INTERVENTIONS:  1. Assess for possible contributors to thought disturbance, including medications, impaired vision or hearing, underlying metabolic abnormalities, dehydration, psychiatric diagnoses, and notify attending LIP  2. [place] high risk fall precautions, as indicated  3. Provide frequent short contacts to provide reality reorientation, refocusing and direction  4. Decrease environmental stimuli, including noise as appropriate  5. Monitor and intervene to maintain adequate

## 2024-05-10 NOTE — PROGRESS NOTES
Occupational Therapy  Facility/Department: [facility] C5 - MED SURG/ORTHO  Daily Treatment Note  NAME: Rubina Werner  : 1945  MRN: [id number]    Date of Service: 5/10/2024    Discharge Recommendations:  Subacute/Skilled Nursing Facility       Therapy discharge recommendations are subject to collaboration from the patient’s interdisciplinary healthcare team, including MD and case management recommendations.  Barriers to Home Discharge:   [x] Steps to access home entry or bed/bath   [x] Unable to transfer, ambulate, or propel wheelchair household distances without assist   [x] Limited available assist at home upon discharge    [] Patient or family requests d/c to post-acute facility    [] Poor cognition/safety awareness for d/c to home alone    [] Unable to maintain ordered weight bearing status    [] Patient with salient signs of long-standing immobility   [x] Decreased independence with ADLs   [x] Increased risk for falls   [] Other:    Patient Diagnosis(es): The primary encounter diagnosis was Fall, initial encounter. Diagnoses of Closed fracture of left hip, initial encounter (HCC) and Intertrochanteric fracture of left femur, closed, initial encounter (HCC) were also pertinent to this visit.     Assessment    Assessment: Pt tolerated session well overall, requiring increased time for most tasks, encouragement for participation particularly in OOB activity due to pain. Pt requiring Ax2 for all bed mobility and transfer training with use of Jackelin STEDY. Pt total A for toileting tasks. Co-tx collaboration this date with PT staff to safely progress pt toward goals. Pt will have better occupational performance outcomes within a co-treatment than 1:1 session. Pt functioning below her baseline, continue to recommend SNF at d/c.   Activity Tolerance: Patient limited by pain  Discharge Recommendations: Subacute/Skilled Nursing Facility      Plan   Occupational Therapy Plan  Times Per Week: x3-5/ week  performed x10-20 reps of the following BUE exercises:  []Grasp/release  []Wrist flexion/extension  [x]Forearm pronation/supination 20x  [x]Elbow flexion/extension 20x  [x]Shoulder flexion/extension 10x  []Chest press  []Shoulder horizontal Abduction/Adduction  []Scapular elevation/retraction           Safety Devices  Type of Devices: Left in chair;Chair alarm in place;Call light within reach;Nurse notified;Gait belt     Patient Education  Education Given To: Patient  Education Provided: Role of Therapy;Plan of Care;Home Exercise Program;Precautions;ADL Adaptive Strategies;Transfer Training;Fall Prevention Strategies  Education Method: Demonstration;Verbal  Barriers to Learning: None  Education Outcome: Verbalized understanding;Demonstrated understanding;Continued education needed    Goals  Short Term Goals  Time Frame for Short Term Goals: 5/17/24 unless noted-- goals ongoing 5/10/24  Short Term Goal 1: Pt will complete LB dress mod A  Short Term Goal 2: Pt will complete toileting mod A  Short Term Goal 3: Pt will complete functional/ toilet transfer modx2 + LRAD  Short Term Goal 4: Pt will complete x2-3 seated grooming ADLs w/ SUPV  Short Term Goal 5: Pt will complete x15 reps BUE therex for inc strengthening in prep for functional transfers/ mobility -- by 5/15  Patient Goals   Patient goals : to go home    AM-PAC - ADL  AM-PAC Daily Activity - Inpatient   How much help is needed for putting on and taking off regular lower body clothing?: Total  How much help is needed for bathing (which includes washing, rinsing, drying)?: A Lot  How much help is needed for toileting (which includes using toilet, bedpan, or urinal)?: Total  How much help is needed for putting on and taking off regular upper body clothing?: A Lot  How much help is needed for taking care of personal grooming?: A Little  How much help for eating meals?: None  AM-PAC Inpatient Daily Activity Raw Score: 13  AM-PAC Inpatient ADL T-Scale Score :

## 2024-05-10 NOTE — DISCHARGE SUMMARY
V2.0  Discharge Summary    Name:  Rubina Werner /Age/Sex: 1945 (78 y.o. female)   Admit Date: 2024  Discharge Date: 5/10/24    MRN & CSN:  [id number] & [id number] Encounter Date and Time 5/10/24 12:10 PM EDT    Attending:  Marin Sheldon MD Discharging Provider: [place] DO       [place] Course:     Brief HPI:  Patient is a 78-year-old female with past medical history of hyperlipidemia and osteoporosis.  Patient is currently on bisphosphonate therapy after a pelvic fracture several years ago.  Approximately 6 to 7 days ago, the patient had unwitnessed acute mechanical fall when she was ambulating from her living room to the kitchen.  She does endorse that at that time she was not using a cane to aid with ambulation.  Patient initially did not want to seek care at ED at that time.  Patient presented to [facility] ED evening of 24.  Radiographic findings significant for left intertrochanteric hip fracture with angulation and suspected comminution.  Orthopedic surgery consulted in ED and recommended CT hip for further evaluation.  Results showed acute, displaced, enteric fracture.  The patient was subsequently inpatient service on 2024 for surgical intervention.  Patient expected to undergo surgical intervention with IM per Dr. Dennis Phillips on 2024. Procedure was technically successful. Patient did experience some post-op anesthesia side effects vs acute delirium, which resolved. She was subsequently discharged in medically stable condition on 5/10 to SNF.         Brief Problem Based Course:   Left Intra-Trochanteric Hip Fracture, Suspected Osteoporotic   - Radiographic and CT findings in Subjective as above.   - Patient completed IM Nail with Dr. Dennis Phillisp on 24   - Ortho following. Their recommendations appreciated:               - WBAT to LLE. Walker to assist.               - PT/OT post-op              - PRN Pain medications               - Completed post-op abx             mediastinum and pleural surfaces appear normal.     1. Left intertrochanteric hip fracture with angulation and likely comminution. 2. No acute cardiopulmonary disease. RECOMMENDATION: Orthopedic consult.     CT HIP LEFT WO CONTRAST    Result Date: 5/6/2024  EXAMINATION: CT OF THE LEFT HIP WITHOUT CONTRAST 5/6/2024 7:09 pm TECHNIQUE: CT of the left hip was performed without the administration of intravenous contrast.  Multiplanar reformatted images are provided for review. Automated exposure control, iterative reconstruction, and/or weight based adjustment of the mA/kV was utilized to reduce the radiation dose to as low as reasonably achievable. COMPARISON: Radiographs May 6, 2024 CT May 6, 2022 HISTORY ORDERING SYSTEM PROVIDED HISTORY: fracture TECHNOLOGIST PROVIDED HISTORY: Reason for exam:->fracture Decision Support Exception - unselect if not a suspected or confirmed emergency medical condition->Emergency Medical Condition (MA) Reason for Exam: fall FINDINGS: Bones: The bone mineral density is severely decreased.  This limits the sensitivity of the exam. There is an acute, comminuted, displaced and angulated intertrochanteric fracture again noted involving the left hip. Posttraumatic deformity involving the left superior and inferior pubic rami consistent with old healed fractures. Chronic appearing, nondisplaced bilateral sacral insufficiency fractures. Soft Tissue: Soft tissue swelling about the left hip, along with interstitial edema of the surrounding musculature, related to acute left hip intertrochanteric fracture. No evidence of lymphadenopathy or mass.  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 Joint: Joint alignment is maintained.  Mild degenerative changes are present involving the bilateral hips.  No evidence of erosion or AVN. Moderate degenerative changes are present involving the bilateral SI joints, with normal joint alignment.     1. Acute, comminuted, displaced

## 2024-05-10 NOTE — CARE COORDINATION
CASE MANAGEMENT DISCHARGE SUMMARY      Discharge to: [facility];SNF    Precertification completed: yes  Hospital Exemption Notification (HENS) completed:     IMM given: 5/9/24    New Durable Medical Equipment ordered/agency:     Transportation:    Medical Transport explained to pt/family. Pt/family voice no agency preference.        Confirmed discharge plan with:     Patient: yes     Family:  spouse at bedside     Facility/Agency, name:  [facility] faxed/electronic. Jaymie at [facility] notified.    Phone number for report to facility: [phone]      RN, name: Angelique     Note: Discharging nurse to complete JD, reconcile AVS, and place final copy with patient's discharge packet. RN to ensure that written prescriptions for  Level II medications are sent with patient to the facility as per protocol.

## 2024-05-10 NOTE — PROGRESS NOTES
Physical Therapy  Facility/Department: [facility] C5 - MED SURG/ORTHO  Daily Treatment Note  NAME: Rubina Werner  : 1945  MRN: [id number]    Date of Service: 5/10/2024    Discharge Recommendations:  Subacute/Skilled Nursing Facility   PT Equipment Recommendations  Other: Defer to next level of care    Patient Diagnosis(es): The primary encounter diagnosis was Fall, initial encounter. Diagnoses of Closed fracture of left hip, initial encounter (HCC) and Intertrochanteric fracture of left femur, closed, initial encounter (HCC) were also pertinent to this visit.    Assessment   Assessment: Pt was seen as PT/OT co treat today in order to safely progress trnasfers and mobility.  Pt required max of 2 supine to sit, A of 2 STS in stedy and used the stedy bed >commode> chair with A Of 2.  Pt particpated in BLE seated and supine exs.  She is recommended for con't skilled PT and SNF at D/C.  Activity Tolerance: Patient limited by pain  Other: Defer to next level of care     Plan    Physical Therapy Plan  General Plan: 1 time a day 7 days a week  Specific Instructions for Next Treatment: Progress mobility as tolerated  Current Treatment Recommendations: Strengthening;Wheelchair mobility training;ROM;Balance training;Gait training;Home exercise program;Functional mobility training;Stair training;Safety education & training;Therapeutic activities;Transfer training;Neuromuscular re-education;Manual;Patient/Caregiver education & training;Equipment evaluation, education, & procurement;Endurance training;Positioning;Modalities     Restrictions  Restrictions/Precautions  Restrictions/Precautions: Fall Risk, General Precautions, Weight Bearing  Lower Extremity Weight Bearing Restrictions  Right Lower Extremity Weight Bearing: Weight Bearing As Tolerated  Position Activity Restriction  Other position/activity restrictions: purewick, s/p L hip nailing (WBAT)     Subjective    Subjective  Subjective: Pt agreeable to PT session  Pain:

## 2024-05-10 NOTE — FLOWSHEET NOTE
Pt discharged to [facility] via transport. Report given to Gisell MURRAY from [facility]. Pt's IV discontinued with no complications noted. Pt's belongings, medication scripts and discharge paperwork sent with pt transport.

## 2024-05-10 NOTE — PLAN OF CARE
Pt resting in bed quietly. Bed in lowest position, wheels locked, side rails up X2, non skid socks on. Bed check alarm engaged. Pt instructed not to get out of bed on own, to use call light for staff assistance when ambulating or other needs. Pt verbalizes understanding. Call light within reach. Will continue to monitor.     Problem: Safety - Adult  Goal: Free from fall injury  5/10/2024 1130 by Angelique Lara, RN  Outcome: Progressing    Problem: ABCDS Injury Assessment  Goal: Absence of physical injury  5/10/2024 1130 by Angelique Lara, RN  Outcome: Progressing       Pt rates pain level using 0-10 pain scale. Pain meds administered as ordered per MAR. Pt instructed to use call light for increasing pain or ineffective pain management. Pt verbalizes understanding. Call light within reach. Will continue to monitor.     Problem: Pain  Goal: Verbalizes/displays adequate comfort level or baseline comfort level  5/10/2024 1130 by Angelique Lara, RN  Outcome: Progressing

## 2024-05-22 ENCOUNTER — OFFICE VISIT (OUTPATIENT)
Dept: ORTHOPEDIC SURGERY | Age: 79
End: 2024-05-22

## 2024-05-22 VITALS — WEIGHT: 98 LBS | HEIGHT: 63 IN | BODY MASS INDEX: 17.36 KG/M2

## 2024-05-22 DIAGNOSIS — Z47.89 ORTHOPEDIC AFTERCARE: Primary | ICD-10-CM

## 2024-05-22 PROCEDURE — 99024 POSTOP FOLLOW-UP VISIT: CPT | Performed by: ORTHOPAEDIC SURGERY

## 2024-05-28 ENCOUNTER — TELEPHONE (OUTPATIENT)
Dept: ORTHOPEDIC SURGERY | Age: 79
End: 2024-05-28

## 2024-05-29 NOTE — TELEPHONE ENCOUNTER
Ok for verbal order  
Other CALLY WITH [facility] IS CALLING FOR VERBAL ORDERS FOR SKILLED, PT AND OT. PH 21 [phone]    
Verbal orders given. KB  
Risks/benefits discussed with patient/surrogate

## (undated) DEVICE — SUTURE VICRYL + SZ 2-0 L36IN ABSRB UD L36MM CT-1 1/2 CIR VCP945H

## (undated) DEVICE — SOLUTION IV IRRIG 500ML 0.9% SODIUM CHL 2F7123

## (undated) DEVICE — STAPLER EXT SKIN 35 WIDE S STL STPL SQUEEZE HNDL VISISTAT

## (undated) DEVICE — PACK PROCEDURE SURG HIP PIN TROCHANTERIC FEM NAIL CDS

## (undated) DEVICE — GLOVE SURG SZ 75 L12IN FNGR THK79MIL GRN LTX FREE

## (undated) DEVICE — LIQUIBAND RAPID ADHESIVE 36/CS 0.8ML: Brand: MEDLINE

## (undated) DEVICE — BIT DRL L L266MM DIA16MM FEM FLX CANN QUIK CPL

## (undated) DEVICE — BIT DRL L500MM DIA6X9MM CANN STP L QUIK CPL FOR DH DC TFN

## (undated) DEVICE — BIT DRL L330MM DIA4.2MM CALIB 100MM 3 FLUT QUIK CPL

## (undated) DEVICE — DRAPE,UTILITY,TAPE,15X26,STERILE: Brand: MEDLINE

## (undated) DEVICE — ROD RMR L950MM DIA3MM W/ STR BALL TIP

## (undated) DEVICE — DRESSING FOAM W4XL4IN SIL FACE BORD ADH PD SUP ABSRB COR

## (undated) DEVICE — 6619 2 PTNT ISO SYS INCISE AREA&LT;(&GT;&&LT;)&GT;P: Brand: STERI-DRAPE™ IOBAN™ 2

## (undated) DEVICE — GLOVE ORTHO 7   MSG9470

## (undated) DEVICE — DRESSING FOAM W3XL3IN GENTLE SIL FACE BORD ADH PD SUP ABSRB

## (undated) DEVICE — SUTURE VICRYL SZ 0 L36IN ABSRB UD CT-1 L36MM 1/2 CIR TAPR PNT VCP946H

## (undated) DEVICE — GOWN SIRUS NONREIN XL W/TWL: Brand: MEDLINE INDUSTRIES, INC.

## (undated) DEVICE — GUIDEWIRE ORTH L400MM DIA3.2MM FOR TFN